# Patient Record
Sex: FEMALE | Race: WHITE | NOT HISPANIC OR LATINO | URBAN - METROPOLITAN AREA
[De-identification: names, ages, dates, MRNs, and addresses within clinical notes are randomized per-mention and may not be internally consistent; named-entity substitution may affect disease eponyms.]

---

## 2017-02-28 ENCOUNTER — OFFICE (OUTPATIENT)
Dept: URBAN - METROPOLITAN AREA OTHER 6 | Facility: OTHER | Age: 57
End: 2017-02-28

## 2017-02-28 VITALS
DIASTOLIC BLOOD PRESSURE: 68 MMHG | HEIGHT: 65 IN | HEART RATE: 88 BPM | SYSTOLIC BLOOD PRESSURE: 124 MMHG | WEIGHT: 163 LBS

## 2017-02-28 DIAGNOSIS — K30 FUNCTIONAL DYSPEPSIA: ICD-10-CM

## 2017-02-28 DIAGNOSIS — Z86.010 PERSONAL HISTORY OF COLONIC POLYPS: ICD-10-CM

## 2017-02-28 PROCEDURE — 99202 OFFICE O/P NEW SF 15 MIN: CPT

## 2017-03-23 ENCOUNTER — ANESTHESIA EVENT (OUTPATIENT)
Dept: PERIOP | Facility: HOSPITAL | Age: 57
End: 2017-03-23

## 2017-03-23 RX ORDER — PANTOPRAZOLE SODIUM 40 MG/1
40 TABLET, DELAYED RELEASE ORAL 2 TIMES DAILY
COMMUNITY
End: 2019-03-07 | Stop reason: SDUPTHER

## 2017-03-23 RX ORDER — SUCRALFATE 1 G/1
1 TABLET ORAL
COMMUNITY
End: 2018-11-05 | Stop reason: SDUPTHER

## 2017-03-23 RX ORDER — LOSARTAN POTASSIUM AND HYDROCHLOROTHIAZIDE 12.5; 5 MG/1; MG/1
0.5 TABLET ORAL DAILY
COMMUNITY

## 2017-03-23 RX ORDER — ONDANSETRON 4 MG/1
4 TABLET, FILM COATED ORAL EVERY 8 HOURS PRN
COMMUNITY
End: 2018-09-13

## 2017-03-24 ENCOUNTER — HOSPITAL ENCOUNTER (OUTPATIENT)
Facility: HOSPITAL | Age: 57
Setting detail: HOSPITAL OUTPATIENT SURGERY
Discharge: HOME | End: 2017-03-24
Attending: INTERNAL MEDICINE | Admitting: INTERNAL MEDICINE

## 2017-03-24 ENCOUNTER — ANESTHESIA (OUTPATIENT)
Dept: PERIOP | Facility: HOSPITAL | Age: 57
End: 2017-03-24

## 2017-03-24 ENCOUNTER — ON CAMPUS - OUTPATIENT (OUTPATIENT)
Dept: URBAN - METROPOLITAN AREA HOSPITAL 28 | Facility: HOSPITAL | Age: 57
End: 2017-03-24
Payer: COMMERCIAL

## 2017-03-24 ENCOUNTER — PREP FOR SURGERY (OUTPATIENT)
Dept: GASTROENTEROLOGY | Facility: HOSPITAL | Age: 57
End: 2017-03-24

## 2017-03-24 VITALS
RESPIRATION RATE: 16 BRPM | DIASTOLIC BLOOD PRESSURE: 72 MMHG | OXYGEN SATURATION: 100 % | SYSTOLIC BLOOD PRESSURE: 110 MMHG | WEIGHT: 157.2 LBS | BODY MASS INDEX: 26.19 KG/M2 | HEART RATE: 89 BPM | HEIGHT: 65 IN | TEMPERATURE: 98.3 F

## 2017-03-24 DIAGNOSIS — K57.30 DIVERTICULOSIS OF LARGE INTESTINE WITHOUT PERFORATION OR ABS: ICD-10-CM

## 2017-03-24 DIAGNOSIS — K62.0 ANAL POLYP: ICD-10-CM

## 2017-03-24 DIAGNOSIS — K62.1 RECTAL POLYP: ICD-10-CM

## 2017-03-24 DIAGNOSIS — K29.60 OTHER GASTRITIS WITHOUT BLEEDING: ICD-10-CM

## 2017-03-24 DIAGNOSIS — Z86.010 HISTORY OF COLON POLYPS: ICD-10-CM

## 2017-03-24 DIAGNOSIS — K63.5 POLYP OF COLON: ICD-10-CM

## 2017-03-24 DIAGNOSIS — R10.13 DYSPEPSIA: ICD-10-CM

## 2017-03-24 DIAGNOSIS — D12.2 BENIGN NEOPLASM OF ASCENDING COLON: ICD-10-CM

## 2017-03-24 DIAGNOSIS — Z86.010 PERSONAL HISTORY OF COLONIC POLYPS: ICD-10-CM

## 2017-03-24 DIAGNOSIS — K21.0 GASTRO-ESOPHAGEAL REFLUX DISEASE WITH ESOPHAGITIS: ICD-10-CM

## 2017-03-24 LAB — POTASSIUM BLD-SCNC: 3.1 MMOL/L (ref 3.5–5.2)

## 2017-03-24 PROCEDURE — 45380 COLONOSCOPY AND BIOPSY: CPT | Mod: 59,33

## 2017-03-24 PROCEDURE — 43239 EGD BIOPSY SINGLE/MULTIPLE: CPT

## 2017-03-24 PROCEDURE — 45380 COLONOSCOPY AND BIOPSY: CPT | Mod: 33,59

## 2017-03-24 PROCEDURE — 45385 COLONOSCOPY W/LESION REMOVAL: CPT | Mod: 33

## 2017-03-24 PROCEDURE — 93010 ELECTROCARDIOGRAM REPORT: CPT | Performed by: INTERNAL MEDICINE

## 2017-03-24 PROCEDURE — 84132 ASSAY OF SERUM POTASSIUM: CPT | Performed by: NURSE ANESTHETIST, CERTIFIED REGISTERED

## 2017-03-24 PROCEDURE — 25010000002 PROPOFOL 10 MG/ML EMULSION: Performed by: NURSE ANESTHETIST, CERTIFIED REGISTERED

## 2017-03-24 PROCEDURE — 93005 ELECTROCARDIOGRAM TRACING: CPT | Performed by: NURSE ANESTHETIST, CERTIFIED REGISTERED

## 2017-03-24 RX ORDER — SODIUM CHLORIDE 0.9 % (FLUSH) 0.9 %
1-10 SYRINGE (ML) INJECTION AS NEEDED
Status: DISCONTINUED | OUTPATIENT
Start: 2017-03-24 | End: 2017-03-24 | Stop reason: HOSPADM

## 2017-03-24 RX ORDER — SODIUM CHLORIDE 0.9 % (FLUSH) 0.9 %
1-10 SYRINGE (ML) INJECTION AS NEEDED
Status: CANCELLED | OUTPATIENT
Start: 2017-03-24

## 2017-03-24 RX ORDER — PROPOFOL 10 MG/ML
VIAL (ML) INTRAVENOUS AS NEEDED
Status: DISCONTINUED | OUTPATIENT
Start: 2017-03-24 | End: 2017-03-24 | Stop reason: SURG

## 2017-03-24 RX ORDER — SODIUM CHLORIDE, SODIUM LACTATE, POTASSIUM CHLORIDE, CALCIUM CHLORIDE 600; 310; 30; 20 MG/100ML; MG/100ML; MG/100ML; MG/100ML
9 INJECTION, SOLUTION INTRAVENOUS CONTINUOUS
Status: DISCONTINUED | OUTPATIENT
Start: 2017-03-24 | End: 2017-03-24 | Stop reason: HOSPADM

## 2017-03-24 RX ORDER — LIDOCAINE HYDROCHLORIDE 20 MG/ML
INJECTION, SOLUTION INFILTRATION; PERINEURAL AS NEEDED
Status: DISCONTINUED | OUTPATIENT
Start: 2017-03-24 | End: 2017-03-24 | Stop reason: SURG

## 2017-03-24 RX ORDER — GLYCOPYRROLATE 0.2 MG/ML
INJECTION INTRAMUSCULAR; INTRAVENOUS AS NEEDED
Status: DISCONTINUED | OUTPATIENT
Start: 2017-03-24 | End: 2017-03-24 | Stop reason: SURG

## 2017-03-24 RX ORDER — LIDOCAINE HYDROCHLORIDE 10 MG/ML
0.3 INJECTION, SOLUTION EPIDURAL; INFILTRATION; INTRACAUDAL; PERINEURAL ONCE
Status: COMPLETED | OUTPATIENT
Start: 2017-03-24 | End: 2017-03-24

## 2017-03-24 RX ADMIN — SODIUM CHLORIDE, POTASSIUM CHLORIDE, SODIUM LACTATE AND CALCIUM CHLORIDE 9 ML/HR: 600; 310; 30; 20 INJECTION, SOLUTION INTRAVENOUS at 13:25

## 2017-03-24 RX ADMIN — PROPOFOL 40 MG: 10 INJECTION, EMULSION INTRAVENOUS at 14:29

## 2017-03-24 RX ADMIN — PROPOFOL 50 MG: 10 INJECTION, EMULSION INTRAVENOUS at 14:20

## 2017-03-24 RX ADMIN — LIDOCAINE HYDROCHLORIDE 0.1 ML: 10 INJECTION, SOLUTION EPIDURAL; INFILTRATION; INTRACAUDAL; PERINEURAL at 13:25

## 2017-03-24 RX ADMIN — PROPOFOL 50 MG: 10 INJECTION, EMULSION INTRAVENOUS at 14:17

## 2017-03-24 RX ADMIN — SODIUM CHLORIDE, POTASSIUM CHLORIDE, SODIUM LACTATE AND CALCIUM CHLORIDE: 600; 310; 30; 20 INJECTION, SOLUTION INTRAVENOUS at 13:59

## 2017-03-24 RX ADMIN — PROPOFOL 50 MG: 10 INJECTION, EMULSION INTRAVENOUS at 14:09

## 2017-03-24 RX ADMIN — PROPOFOL 30 MG: 10 INJECTION, EMULSION INTRAVENOUS at 14:25

## 2017-03-24 RX ADMIN — PROPOFOL 50 MG: 10 INJECTION, EMULSION INTRAVENOUS at 14:14

## 2017-03-24 RX ADMIN — GLYCOPYRROLATE 0.2 MG: 0.2 INJECTION INTRAMUSCULAR; INTRAVENOUS at 14:02

## 2017-03-24 RX ADMIN — LIDOCAINE HYDROCHLORIDE 100 MG: 20 INJECTION, SOLUTION INFILTRATION; PERINEURAL at 14:06

## 2017-03-24 RX ADMIN — PROPOFOL 20 MG: 10 INJECTION, EMULSION INTRAVENOUS at 14:36

## 2017-03-24 RX ADMIN — PROPOFOL 40 MG: 10 INJECTION, EMULSION INTRAVENOUS at 14:34

## 2017-03-24 RX ADMIN — PROPOFOL 100 MG: 10 INJECTION, EMULSION INTRAVENOUS at 14:06

## 2017-03-24 NOTE — ANESTHESIA PREPROCEDURE EVALUATION
Anesthesia Evaluation     Patient summary reviewed and Nursing notes reviewed   no history of anesthetic complications:  NPO Status: > 6 hours   Airway   Mallampati: III  TM distance: >3 FB  Neck ROM: full  possible difficult intubation  Dental - normal exam     Pulmonary - normal exam   Cardiovascular - normal exam  Exercise tolerance: excellent (>7 METS)    Rhythm: regular  Rate: normal    (+) hypertension well controlled,       Neuro/Psych- negative ROS  GI/Hepatic/Renal/Endo    (+)  GERD poorly controlled,     Musculoskeletal (-) negative ROS    Abdominal  - normal exam   Substance History - negative use     OB/GYN          Other - negative ROS                                   Anesthesia Plan    ASA 2     MAC     Anesthetic plan and risks discussed with patient.  Use of blood products discussed with patient .
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
## 2017-03-24 NOTE — ANESTHESIA POSTPROCEDURE EVALUATION
Patient: Ganesh Caban    Procedure Summary     Date Anesthesia Start Anesthesia Stop Room / Location    03/24/17 0809 [id number] [facility] / [facility]       Procedure Diagnosis Surgeon Provider    ESOPHAGOGASTRODUODENOSCOPY WITH BIOPSIES (N/A Esophagus); COLONOSCOPY; POLYPECTOMY (N/A ) Gastritis; Reflux esophagitis; Diverticula of colon; Polyp of colon  (Z86.010, K30) MD Te Swenson CRNA          Anesthesia Type: MAC  Last vitals  BP      Temp      Pulse     Resp      SpO2        Post Anesthesia Care and Evaluation    Patient location during evaluation: PHASE II  Patient participation: complete - patient participated  Level of consciousness: awake and alert  Pain score: 0  Pain management: adequate  Airway patency: patent  Anesthetic complications: No anesthetic complications  PONV Status: none  Cardiovascular status: acceptable  Respiratory status: acceptable  Hydration status: acceptable

## 2017-03-24 NOTE — PLAN OF CARE
Problem: GI Endoscopy (Adult)  Goal: Signs and Symptoms of Listed Potential Problems Will be Absent or Manageable (GI Endoscopy)  Outcome: Ongoing (interventions implemented as appropriate)    03/24/17 1403   GI Endoscopy   Problems Assessed (GI Endoscopy) all   Problems Present (GI Endoscopy) none

## 2017-03-24 NOTE — OP NOTE
DATE OF PROCEDURE:  03/24/2017    PROCEDURES PERFORMED:   Esophagogastroduodenoscopy with biopsy.     SURGEON:  Myke Robbins MD     INDICATIONS:    1.  Refractory dyspepsia, despite her PPI.  2.  Mild dysphagia.     MEDICATIONS:  Monitored anesthesia care; please see Anesthesia record.    DESCRIPTION OF PROCEDURE:  Risks and benefits of the procedure were explained to the patient. Informed consent was signed. She was placed in left lateral decubitus position and given her IV sedation. Bite block was placed between her incisors. Scope was introduced in into oropharynx, advanced under direct visualization into the esophagus, through to the distal esophagus. Z-line was mildly irregular. Scope was advanced through this easily. There was no evidence of stricture, and retroflexed, revealing normal cardia and fundus. There was evident as well as healing ulcers versus Chhaya-Eller tears, along the lesser curve; there were 2 of these. Scope was deretroflexed and advanced in the antrum. There was diffuse erythematous pattern to the distal body and streaky erythema in the antrum. The scope was advanced through the pylorus through the duodenal bulb which was examined and normal, and around the angle to 2nd and 3rd portions of the duodenum were normal as well. Scope was withdrawn back in the antrum. Biopsies were taken of the antrum, body, and lesser curve, and labeled random gastric biopsies. Scope was withdrawn in the distal esophagus and biopsies were taken to rule out short segment Doshi's esophagus; this was not suspected visually. The scope was withdrawn. The remainder of  esophagus was normal-appearing. Scope was taken from the patient. She tolerated the procedure very well.     IMPRESSION:   1.  Normal duodenum.   2.  Moderate gastritis, including possible Chhaya-Eller tears; status post biopsies.   3.  Reflux esophagitis, status post biopsies.     RECOMMENDATIONS:  At this point, the patient is  subjectively improved on her combination of Protonix and Carafate. We will continue these medicines; get her the biopsy results as soon as they are available, along with the appropriate recommendations. Have her follow up in the office in 6 weeks.        Myke Robbins M.D.  SO/ms  D:  03/24/2017 14:45:18   T:  03/24/2017 16:37:02   Job ID:  [id number]   Document ID:  [id number]  cc:  Amparo Ackerman M.D.*

## 2017-03-24 NOTE — OP NOTE
DATE OF PROCEDURE:  03/24/2017    PROCEDURES PERFORMED:  Colonoscopy with polypectomy.     SURGEON:  Myke Robbins MD     INDICATION: Screening for colorectal cancer, in patient with previous colon polyps.     MEDICATIONS:  Monitored anesthesia care; please see Anesthesia record.     DESCRIPTION OF PROCEDURE:  Risks and benefits of the procedure were explained to the patient. Informed consent was signed. She was placed in left lateral decubitus position and given her IV sedation. A rectal exam revealed no external lesions, normal anal tone; no rectal mass. Scope was introduced in rectum, advanced under direct visualization with ease, through a well-prepped colon. There were 2 large polyps noted within the sigmoid colon. Scope was advanced around the splenic flexure, reduced; advanced through transverse colon. There were scattered diverticula noted on the left as well. Scope was advanced around the hepatic flexure, reduced in the ascending colon, then advanced in the cecum. Cecum was well-prepped, and identified by the appendiceal orifice and the ileocecal valve. Ileocecal valve was intubated. Terminal ileum was normal in appearance. Scope was withdrawn back in the ascending colon. On the first fold of the ascending colon was a sessile, 6 x 12 mm polyp that was snared and retrieved with excellent hemostasis; sent separately as ascending colon polyp. Scope was withdrawn through the remainder of the ascending colon; no further mucosal lesions were noted there. Around the hepatic flexure in the transverse colon, there was a sessile, hyperplastic-appearing polyp that was 4 x 6 mm noted in the transverse colon. This was removed by cold biopsy forceps and sent separately. Scope was then withdrawn around the splenic flexure, through the descending colon, to approximately 40 cm, where there was a sessile, broad-based polyp noted, 1.4 x 1 cm. It was snared in piecemeal fashion and felt to be completely removed; sent  as polyp at 40 cm. The scope was then withdrawn to 30 cm, where a second large polyp was noted. It was broad-based and 1 x 1 cm in size. It was removed with hot cautery and sent separately as polyp at 30 cm. Scope was then withdrawn through the remainder of the sigmoid colon, where the diverticula were limited to the rectum; where there was a diminutive, 3 x 4 mm polyp that was biopsied; felt to be completely removed and sent separately as rectal polyp. Scope was retroflexed, revealing intact dentate line and no further mucosal lesions. Scope was deretroflexed, withdrawn. Patient tolerated procedure very well.     IMPRESSION:    1.  Colonoscopy to terminal ileum with good prep.   2.  Multiple polyps, numbering 5; removed 1 each from the ascending colon, transverse colon, sigmoid at 40, sigmoid at 30, rectum.   3.  Sigmoid diverticulosis.    RECOMMENDATIONS:  Have the patient avoid aspirin and nonsteroidal anti-inflammatory drugs for 2 weeks to decrease her risk of postpolypectomy bleeding. We will contact her with the results of polyps as soon as they are available. However, most likely recommend a repeat exam in 3 years.        Myke Robbins M.D.  SO/ms  D:  03/24/2017 14:48:56   T:  03/24/2017 16:47:13   Job ID:  [id number]   Document ID:  [id number]  cc:  Amparo Ackerman M.D.*

## 2017-03-24 NOTE — PLAN OF CARE
Problem: Patient Care Overview (Adult)  Goal: Plan of Care Review  Outcome: Ongoing (interventions implemented as appropriate)    03/24/17 1333   Coping/Psychosocial Response Interventions   Plan Of Care Reviewed With patient   Patient Care Overview   Progress no change   Outcome Evaluation   Outcome Summary/Follow up Plan VSS, NO CO, READY FOR PROCEDURE       Goal: Adult Individualization and Mutuality  Outcome: Ongoing (interventions implemented as appropriate)    Problem: GI Endoscopy (Adult)  Goal: Signs and Symptoms of Listed Potential Problems Will be Absent or Manageable (GI Endoscopy)  Outcome: Ongoing (interventions implemented as appropriate)

## 2017-03-24 NOTE — PLAN OF CARE
Problem: Patient Care Overview (Adult)  Goal: Plan of Care Review  Outcome: Ongoing (interventions implemented as appropriate)    03/24/17 1446   Coping/Psychosocial Response Interventions   Plan Of Care Reviewed With patient   Patient Care Overview   Progress improving   Outcome Evaluation   Outcome Summary/Follow up Plan vss, ready for dc       Goal: Adult Individualization and Mutuality  Outcome: Ongoing (interventions implemented as appropriate)    Problem: GI Endoscopy (Adult)  Goal: Signs and Symptoms of Listed Potential Problems Will be Absent or Manageable (GI Endoscopy)  Outcome: Ongoing (interventions implemented as appropriate)

## 2017-03-24 NOTE — BRIEF OP NOTE
ESOPHAGOGASTRODUODENOSCOPY, COLONOSCOPY  Procedure Note    Ganesh Caban  3/24/2017    Pre-op Diagnosis:   Z86.010, K30    Post-op Diagnosis:     Post-Op Diagnosis Codes:     * Gastritis [K29.70]     * Reflux esophagitis [K21.0]     * Diverticula of colon [K57.30]     * Polyp of colon [K63.5]    Procedure/CPT® Codes:      Procedure(s):  ESOPHAGOGASTRODUODENOSCOPY WITH BIOPSIES  COLONOSCOPY; POLYPECTOMY    Surgeon(s):  Myke Robbins MD    Anesthesia: Monitor Anesthesia Care    Staff:   Circulator: Mirta Del Rio RN  Scrub Person: Cherise Rooney    Estimated Blood Loss: * No values recorded between 3/24/2017  1:58 PM and 3/24/2017  2:38 PM *  Urine Voided: * No values recorded between 3/24/2017  1:58 PM and 3/24/2017  2:38 PM *    Specimens:                  ID Type Source Tests Collected by Time Destination   A :  Tissue Stomach TISSUE EXAM Myke Robbins MD 3/24/2017 1412    B :  Tissue Esophagus, Distal TISSUE EXAM Myke Robbins MD 3/24/2017 1414    C :  Polyp Large Intestine, Right / Ascending Colon TISSUE EXAM Myke Robbins MD 3/24/2017 1422    D :  Polyp Large Intestine, Transverse Colon TISSUE EXAM Myke Robbins MD 3/24/2017 1426    E : POLYP @ 40 CM Polyp Large Intestine TISSUE EXAM Myke Robbins MD 3/24/2017 1429    F : POLYP @ 30 CM Polyp Large Intestine TISSUE EXAM Myke Robbisn MD 3/24/2017 1434    G :  Polyp Large Intestine, Rectum TISSUE EXAM Myke Robbins MD 3/24/2017 1437          Drains:           Findings: Normal Duodenum  Moderate Gastritis with ?able MWT-Biopsy  Reflux esophagitis - BIopsy  Colon to TI good prep  Polyps (5)Snared    Complications: none   [id number]      Myke Robbins MD     Date: 3/24/2017  Time: 2:41 PM

## 2017-03-30 LAB
LAB AP CASE REPORT: NORMAL
Lab: NORMAL
PATH REPORT.FINAL DX SPEC: NORMAL

## 2017-09-11 ENCOUNTER — OFFICE (OUTPATIENT)
Dept: URBAN - METROPOLITAN AREA CLINIC 71 | Facility: CLINIC | Age: 57
End: 2017-09-11

## 2017-09-11 VITALS
DIASTOLIC BLOOD PRESSURE: 86 MMHG | HEIGHT: 65 IN | HEART RATE: 72 BPM | SYSTOLIC BLOOD PRESSURE: 122 MMHG | WEIGHT: 161 LBS

## 2017-09-11 DIAGNOSIS — K21.0 GASTRO-ESOPHAGEAL REFLUX DISEASE WITH ESOPHAGITIS: ICD-10-CM

## 2017-09-11 PROCEDURE — 99212 OFFICE O/P EST SF 10 MIN: CPT

## 2018-01-08 ENCOUNTER — LAB (OUTPATIENT)
Dept: LAB | Facility: HOSPITAL | Age: 58
End: 2018-01-08
Attending: INTERNAL MEDICINE

## 2018-01-08 ENCOUNTER — OFFICE (OUTPATIENT)
Dept: URBAN - METROPOLITAN AREA CLINIC 71 | Facility: CLINIC | Age: 58
End: 2018-01-08

## 2018-01-08 ENCOUNTER — TRANSCRIBE ORDERS (OUTPATIENT)
Dept: ADMINISTRATIVE | Facility: HOSPITAL | Age: 58
End: 2018-01-08

## 2018-01-08 VITALS
SYSTOLIC BLOOD PRESSURE: 110 MMHG | WEIGHT: 164 LBS | HEIGHT: 65 IN | HEART RATE: 64 BPM | DIASTOLIC BLOOD PRESSURE: 78 MMHG

## 2018-01-08 DIAGNOSIS — K21.00 REFLUX ESOPHAGITIS: ICD-10-CM

## 2018-01-08 DIAGNOSIS — K59.00 CONSTIPATION, UNSPECIFIED: ICD-10-CM

## 2018-01-08 DIAGNOSIS — K59.00 CONSTIPATION, UNSPECIFIED CONSTIPATION TYPE: ICD-10-CM

## 2018-01-08 DIAGNOSIS — K21.0 GASTRO-ESOPHAGEAL REFLUX DISEASE WITH ESOPHAGITIS: ICD-10-CM

## 2018-01-08 DIAGNOSIS — K21.00 REFLUX ESOPHAGITIS: Primary | ICD-10-CM

## 2018-01-08 PROCEDURE — 36415 COLL VENOUS BLD VENIPUNCTURE: CPT

## 2018-01-08 PROCEDURE — 99214 OFFICE O/P EST MOD 30 MIN: CPT

## 2018-01-08 PROCEDURE — 83516 IMMUNOASSAY NONANTIBODY: CPT

## 2018-01-09 LAB
ENDOMYSIUM IGA SER QL: NEGATIVE
GLIADIN PEPTIDE IGA SER-ACNC: 5 UNITS (ref 0–19)
GLIADIN PEPTIDE IGG SER-ACNC: 3 UNITS (ref 0–19)
IGA SERPL-MCNC: 316 MG/DL (ref 87–352)
TTG IGA SER-ACNC: <2 U/ML (ref 0–3)
TTG IGG SER-ACNC: <2 U/ML (ref 0–5)

## 2018-01-25 ENCOUNTER — HOSPITAL ENCOUNTER (OUTPATIENT)
Dept: NUCLEAR MEDICINE | Facility: HOSPITAL | Age: 58
Discharge: HOME | End: 2018-01-25
Attending: INTERNAL MEDICINE

## 2018-01-25 DIAGNOSIS — K21.00 REFLUX ESOPHAGITIS: ICD-10-CM

## 2018-01-25 DIAGNOSIS — K59.00 CONSTIPATION, UNSPECIFIED CONSTIPATION TYPE: ICD-10-CM

## 2018-01-25 PROCEDURE — A9541 TC99M SULFUR COLLOID: HCPCS | Performed by: INTERNAL MEDICINE

## 2018-01-25 PROCEDURE — 0 TECHNETIUM SULFUR COLLOID: Performed by: INTERNAL MEDICINE

## 2018-01-25 PROCEDURE — 78264 GASTRIC EMPTYING IMG STUDY: CPT

## 2018-01-25 RX ADMIN — TECHNETIUM TC 99M SULFUR COLLOID 1 DOSE: KIT at 08:30

## 2018-03-19 ENCOUNTER — OFFICE (OUTPATIENT)
Dept: URBAN - METROPOLITAN AREA CLINIC 71 | Facility: CLINIC | Age: 58
End: 2018-03-19

## 2018-03-19 VITALS
DIASTOLIC BLOOD PRESSURE: 80 MMHG | SYSTOLIC BLOOD PRESSURE: 120 MMHG | HEART RATE: 64 BPM | HEIGHT: 65 IN | WEIGHT: 166 LBS

## 2018-03-19 DIAGNOSIS — Z86.010 PERSONAL HISTORY OF COLONIC POLYPS: ICD-10-CM

## 2018-03-19 DIAGNOSIS — K59.00 CONSTIPATION, UNSPECIFIED: ICD-10-CM

## 2018-03-19 DIAGNOSIS — K21.0 GASTRO-ESOPHAGEAL REFLUX DISEASE WITH ESOPHAGITIS: ICD-10-CM

## 2018-03-19 PROCEDURE — 99213 OFFICE O/P EST LOW 20 MIN: CPT

## 2018-07-23 ENCOUNTER — OFFICE VISIT (OUTPATIENT)
Dept: GASTROENTEROLOGY | Facility: CLINIC | Age: 58
End: 2018-07-23

## 2018-07-23 VITALS — BODY MASS INDEX: 27.59 KG/M2 | WEIGHT: 165.6 LBS | HEIGHT: 65 IN

## 2018-07-23 DIAGNOSIS — K21.00 GASTROESOPHAGEAL REFLUX DISEASE WITH ESOPHAGITIS: ICD-10-CM

## 2018-07-23 DIAGNOSIS — R10.11 RUQ ABDOMINAL PAIN: Primary | ICD-10-CM

## 2018-07-23 PROCEDURE — 99214 OFFICE O/P EST MOD 30 MIN: CPT | Performed by: INTERNAL MEDICINE

## 2018-07-23 RX ORDER — RANITIDINE 300 MG/1
300 TABLET ORAL NIGHTLY
Qty: 90 TABLET | Refills: 3 | Status: SHIPPED | OUTPATIENT
Start: 2018-07-23 | End: 2018-10-30
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
## 2018-08-23 ENCOUNTER — OFFICE VISIT (OUTPATIENT)
Dept: OBSTETRICS AND GYNECOLOGY | Age: 58
End: 2018-08-23

## 2018-08-23 VITALS
HEIGHT: 65 IN | SYSTOLIC BLOOD PRESSURE: 112 MMHG | BODY MASS INDEX: 28.16 KG/M2 | WEIGHT: 169 LBS | DIASTOLIC BLOOD PRESSURE: 74 MMHG

## 2018-08-23 DIAGNOSIS — Z11.51 ENCOUNTER FOR SCREENING FOR HUMAN PAPILLOMAVIRUS (HPV): ICD-10-CM

## 2018-08-23 DIAGNOSIS — N81.11 MIDLINE CYSTOCELE: ICD-10-CM

## 2018-08-23 DIAGNOSIS — Z01.419 WELL WOMAN EXAM WITH ROUTINE GYNECOLOGICAL EXAM: Primary | ICD-10-CM

## 2018-08-23 DIAGNOSIS — K40.90 UNILATERAL INGUINAL HERNIA WITHOUT OBSTRUCTION OR GANGRENE, RECURRENCE NOT SPECIFIED: ICD-10-CM

## 2018-08-23 DIAGNOSIS — Z12.39 BREAST CANCER SCREENING: ICD-10-CM

## 2018-08-23 PROCEDURE — 99386 PREV VISIT NEW AGE 40-64: CPT | Performed by: OBSTETRICS & GYNECOLOGY

## 2018-08-23 NOTE — PROGRESS NOTES
"Chief complaint: New Gyn/annual    Subjective   History of Present Illness    Ganesh Caban is a 57 y.o.  who presents for annual exam. Has been seen by this office in past, over 3 yrs ago. Last seen by Dr Henson.  H/o menopause . No postmenopausal bleeding. Does has some discomfort w/ intercourse with \"bulge\" in vagina. Was told she had pelvic prolapse in past. Did go to pelvic PT but didn't get better. Desires evaluation and tx for pelvic prolapse.    Soc Hx- ,     Obstetric History:  OB History      Para Term  AB Living    1       1      SAB TAB Ectopic Molar Multiple Live Births    1                  Obstetric Comments    H/o infertility         Menstrual History:     No LMP recorded (lmp unknown). Patient is postmenopausal.         Current contraception: post menopausal status  History of abnormal Pap smear: no  Received Gardasil immunization: no  Perform regular self breast exam: yes -    Family history of uterine or ovarian cancer: no  Family History of colon cancer: no  Family history of breast cancer: no    Mammogram: ordered.  Colonoscopy: up to date.  DEXA: not indicated.    Exercise: moderately active  Calcium/Vitamin D: adequate intake    The following portions of the patient's history were reviewed and updated as appropriate: allergies, current medications, past family history, past medical history, past social history, past surgical history and problem list.    Review of Systems   Constitutional: Negative for activity change, fatigue, fever and unexpected weight change.   Respiratory: Negative for chest tightness and shortness of breath.    Cardiovascular: Negative for chest pain, palpitations and leg swelling.   Gastrointestinal: Negative for abdominal distention, abdominal pain, blood in stool, constipation, diarrhea, nausea and vomiting.   Endocrine: Negative for cold intolerance, heat intolerance, polydipsia, polyphagia and polyuria.   Genitourinary: " "Positive for dyspareunia.   Musculoskeletal: Negative for arthralgias and back pain.   Skin: Negative for color change.   Neurological: Negative for weakness and headaches.   Hematological: Does not bruise/bleed easily.   Psychiatric/Behavioral: Negative for confusion.       Pertinent items are noted in HPI.     Objective   Physical Exam    /74   Ht 165.1 cm (65\")   Wt 76.7 kg (169 lb)   LMP  (LMP Unknown)   BMI 28.12 kg/m²     General:   alert, appears stated age and cooperative   Neck: no asymmetry, masses, or scars   Heart: regular rate and rhythm, S1, S2 normal, no murmur, click, rub or gallop   Lungs: clear to auscultation bilaterally   Abdomen: Soft obese, NT, large hernia in LLQ   Breast: inspection negative, no nipple discharge or bleeding, no masses or nodularity palpable and , bilateral implants noted   Vulva: normal, Bartholin's, Urethra, Spur's normal   Vagina: atrophic, cytocele prolapses to level of introitus   Cervix: no bleeding following Pap, no cervical motion tenderness, no lesions and nulliparous appearance   Uterus: normal size, non-tender, normal shape and consistency, uterine prolapse with valsalva   Adnexa: normal adnexa and no mass, fullness, tenderness   Rectal: not indicated     Assessment/Plan   Ganesh was seen today for gynecologic exam.    Diagnoses and all orders for this visit:    Well woman exam with routine gynecological exam    Encounter for screening for human papillomavirus (HPV)    Breast cancer screening  -     Mammo screening bilateral w CAD; Future    tBreast self exam technique reviewed and patient encouraged to perform self-exam monthly.  Discussed healthy lifestyle modifications.  Pap smear sent    Left inguinal hernia- refer to general surgery for eval and treatment    Pelvic floor prolapse- referred to Urogyn for evaluation for possible surgical tx    Pap w/ HPV sent             "

## 2018-08-29 LAB
CYTOLOGIST CVX/VAG CYTO: NORMAL
CYTOLOGY CVX/VAG DOC THIN PREP: NORMAL
DX ICD CODE: NORMAL
HIV 1 & 2 AB SER-IMP: NORMAL
HPV I/H RISK 1 DNA CVX QL PROBE+SIG AMP: NEGATIVE
Lab: NORMAL
OTHER STN SPEC: NORMAL
PATH REPORT.FINAL DX SPEC: NORMAL
STAT OF ADQ CVX/VAG CYTO-IMP: NORMAL

## 2018-08-30 ENCOUNTER — TELEPHONE (OUTPATIENT)
Dept: OBSTETRICS AND GYNECOLOGY | Age: 58
End: 2018-08-30

## 2018-09-13 ENCOUNTER — OFFICE VISIT (OUTPATIENT)
Dept: SURGERY | Facility: CLINIC | Age: 58
End: 2018-09-13

## 2018-09-13 VITALS
SYSTOLIC BLOOD PRESSURE: 104 MMHG | OXYGEN SATURATION: 98 % | DIASTOLIC BLOOD PRESSURE: 70 MMHG | HEIGHT: 65 IN | BODY MASS INDEX: 28.02 KG/M2 | WEIGHT: 168.2 LBS | HEART RATE: 92 BPM

## 2018-09-13 DIAGNOSIS — K40.90 LEFT INGUINAL HERNIA: Primary | ICD-10-CM

## 2018-09-13 DIAGNOSIS — Z23 NEED FOR IMMUNIZATION AGAINST INFLUENZA: ICD-10-CM

## 2018-09-13 PROCEDURE — 99203 OFFICE O/P NEW LOW 30 MIN: CPT | Performed by: SURGERY
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
## 2018-09-18 ENCOUNTER — HOSPITAL ENCOUNTER (OUTPATIENT)
Dept: ULTRASOUND IMAGING | Facility: HOSPITAL | Age: 58
Discharge: HOME | End: 2018-09-18
Attending: INTERNAL MEDICINE | Admitting: INTERNAL MEDICINE

## 2018-09-18 DIAGNOSIS — R10.11 RUQ ABDOMINAL PAIN: ICD-10-CM

## 2018-09-18 PROCEDURE — 76705 ECHO EXAM OF ABDOMEN: CPT

## 2018-09-27 ENCOUNTER — OFFICE VISIT (OUTPATIENT)
Dept: GASTROENTEROLOGY | Facility: CLINIC | Age: 58
End: 2018-09-27

## 2018-09-27 VITALS — WEIGHT: 173 LBS | BODY MASS INDEX: 28.82 KG/M2 | HEIGHT: 65 IN

## 2018-09-27 DIAGNOSIS — R10.13 DYSPEPSIA: ICD-10-CM

## 2018-09-27 DIAGNOSIS — K21.00 GASTROESOPHAGEAL REFLUX DISEASE WITH ESOPHAGITIS: Primary | ICD-10-CM

## 2018-09-27 PROCEDURE — 99213 OFFICE O/P EST LOW 20 MIN: CPT | Performed by: INTERNAL MEDICINE
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
## 2018-10-09 ENCOUNTER — APPOINTMENT (OUTPATIENT)
Dept: PREADMISSION TESTING | Facility: HOSPITAL | Age: 58
End: 2018-10-09

## 2018-10-09 VITALS
SYSTOLIC BLOOD PRESSURE: 118 MMHG | BODY MASS INDEX: 28.62 KG/M2 | HEIGHT: 65 IN | HEART RATE: 88 BPM | WEIGHT: 171.8 LBS | OXYGEN SATURATION: 98 % | RESPIRATION RATE: 16 BRPM | DIASTOLIC BLOOD PRESSURE: 84 MMHG

## 2018-10-09 LAB
ANION GAP SERPL CALCULATED.3IONS-SCNC: 9.4 MMOL/L
BUN BLD-MCNC: 11 MG/DL (ref 6–20)
BUN/CREAT SERPL: 16.9 (ref 7–25)
CALCIUM SPEC-SCNC: 9.1 MG/DL (ref 8.6–10.5)
CHLORIDE SERPL-SCNC: 102 MMOL/L (ref 98–107)
CO2 SERPL-SCNC: 27.6 MMOL/L (ref 22–29)
CREAT BLD-MCNC: 0.65 MG/DL (ref 0.57–1)
DEPRECATED RDW RBC AUTO: 42.1 FL (ref 37–54)
ERYTHROCYTE [DISTWIDTH] IN BLOOD BY AUTOMATED COUNT: 12.2 % (ref 11.5–14.5)
GFR SERPL CREATININE-BSD FRML MDRD: 95 ML/MIN/1.73
GLUCOSE BLD-MCNC: 82 MG/DL (ref 65–99)
HCT VFR BLD AUTO: 40.6 % (ref 37–47)
HGB BLD-MCNC: 13.4 G/DL (ref 12–16)
MCH RBC QN AUTO: 30.8 PG (ref 27–31)
MCHC RBC AUTO-ENTMCNC: 33 G/DL (ref 31–37)
MCV RBC AUTO: 93.3 FL (ref 81–99)
PLATELET # BLD AUTO: 218 10*3/MM3 (ref 140–500)
PMV BLD AUTO: 10 FL (ref 7.4–10.4)
POTASSIUM BLD-SCNC: 3.6 MMOL/L (ref 3.5–5.2)
RBC # BLD AUTO: 4.35 10*6/MM3 (ref 4.2–5.4)
SODIUM BLD-SCNC: 139 MMOL/L (ref 136–145)
WBC NRBC COR # BLD: 5.14 10*3/MM3 (ref 4.8–10.8)

## 2018-10-09 PROCEDURE — 85027 COMPLETE CBC AUTOMATED: CPT | Performed by: SURGERY

## 2018-10-09 PROCEDURE — 36415 COLL VENOUS BLD VENIPUNCTURE: CPT

## 2018-10-09 PROCEDURE — 80048 BASIC METABOLIC PNL TOTAL CA: CPT | Performed by: SURGERY

## 2018-10-09 RX ORDER — LORATADINE 10 MG/1
10 TABLET ORAL DAILY
COMMUNITY

## 2018-10-09 RX ORDER — CHLORAL HYDRATE 500 MG
1000 CAPSULE ORAL
COMMUNITY

## 2018-10-09 NOTE — PAT
Pt here for PAT visit.  Pre-op tests completed, chg soap given, and instructions reviewed.  Instructed clears until 11:00 am dos, voiced understanding.  EKG obtained from PCP office and sent to media tab.

## 2018-10-09 NOTE — DISCHARGE INSTRUCTIONS
PRE-ADMISSION TESTING INSTRUCTIONS FOR ADULTS    Take these medications the morning of surgery with a small sip of water: protonix      No aspirin, advil, aleve, ibuprofen, naproxen, diet pills, decongestants, or herbal/vitamins for a week prior to surgery.  Stop vitamins, fish oil and garlic a week prior to surgery.    General Instructions:    • Do not eat solid food after midnight the night before surgery.  No gum, mints, or hard candy after midnight the night before surgery.  • You may drink clear liquids the day of surgery up until 2 hours before your arrival time.  (Until 11:00 am)  • Clear liquids are liquids you can see through. Nothing RED in color.    Plain water    Sports drinks  Sodas     Gelatin (Jell-O)  Fruit juices without pulp such as white grape juice and apple juice  Popsicles that contain no fruit or yogurt  Tea or coffee (no cream or milk added)    • It is beneficial for you to have a clear drink that contains carbohydrates just before you leave your house and before your fasting time begins.  We suggest a 20 ounce bottle of Gatorade or Powerade for non-diabetic patients or a 20 ounce bottle of G2 or Powerade Zero for diabetic patients.     • Patients who avoid smoking, chewing tobacco and alcohol for 4 weeks prior to surgery have a reduced risk of post-operative complications.  If at all possible, quit smoking as many days before surgery as you can.    • Do not smoke, use chewing tobacco or drink alcohol the day of surgery    • Bring your C-PAP/ BI-PAP machine if you use one.  • Wear clean comfortable clothes and socks.  • Do not wear contact lenses, lotion, deodorant, or make-up.  Bring a case for your glasses if applicable. You may brush your teeth the morning of surgery.  • You may wear dentures/partials, do not put adhesive/glue on them.  • Bring crutches or walker if applicable.  • Leave all other jewelry and valuables at home.      Preventing a Surgical Site Infection:    • Shower the  night before and on the morning of surgery using the chlorhexidine soap you were given.  Use a clean washcloth with the soap.  Place clean sheets on your bed after showering the night before surgery. Do not use the CHG soap on your hair, face, or private areas. Wash your body gently for five (5) minutes. Do not scrub your skin.  Dry with a clean towel and dress in clean clothing.    • Do not shave the surgical area for 10 days-2 weeks prior to surgery  because the razor can irritate skin and make it easier to develop an infection.  • Make sure you, your family, and all healthcare providers clean their hands with soap and water or an alcohol based hand  before caring for you or your wound.      Day of surgery:    Your surgeon’s office will advise you of your arrival time for the day of surgery.    Upon arrival, a Pre-op nurse and Anesthesia provider will review your health history, obtain vital signs, and answer questions you may have.  The only belongings needed at this time will be your home medications and if applicable your C-PAP/BI-PAP machine.  If you are staying overnight your family can leave the rest of your belongings in the car and bring them to your room later.  A Pre-op nurse will start an IV and you may receive medication in preparation for surgery, including something to help you relax.  Your family will be able to see you in the Pre-op area.  While you are in surgery your family should notify the waiting room  if they leave the waiting room area and provide a contact phone number.    IF you have any questions, you can call the Pre-Admission Department at [phone] or your surgeon's office.  Notify your surgeon if  you become sick, have a fever, productive cough, or cannot be here the day of surgery    Please be aware that surgery does come with discomfort.  We want to make every effort to control your discomfort so please discuss any uncontrolled symptoms with your nurse.    Your doctor will most likely have prescribed pain medications.      If you are going home after surgery, you will receive individualized written care instructions before being discharged.  A responsible adult (over the age of 18) must drive you to and from the hospital on the day of your surgery and stay with you for 24 hours after anesthesia.    If you are staying overnight following surgery, you will be transported to your hospital room following the recovery period.  [facility] has all private rooms.    Deductibles and co-payments are collected on the day of service. Please be prepared to pay the required co-pay, deductible or deposit on the day of service as defined by your plan.

## 2018-10-16 ENCOUNTER — ANESTHESIA EVENT (OUTPATIENT)
Dept: PERIOP | Facility: HOSPITAL | Age: 58
End: 2018-10-16

## 2018-10-17 ENCOUNTER — ANESTHESIA (OUTPATIENT)
Dept: PERIOP | Facility: HOSPITAL | Age: 58
End: 2018-10-17

## 2018-10-17 ENCOUNTER — HOSPITAL ENCOUNTER (OUTPATIENT)
Facility: HOSPITAL | Age: 58
Setting detail: HOSPITAL OUTPATIENT SURGERY
Discharge: HOME | End: 2018-10-17
Attending: SURGERY | Admitting: NURSE ANESTHETIST, CERTIFIED REGISTERED

## 2018-10-17 VITALS
TEMPERATURE: 96.4 F | HEART RATE: 84 BPM | DIASTOLIC BLOOD PRESSURE: 79 MMHG | WEIGHT: 171.2 LBS | SYSTOLIC BLOOD PRESSURE: 118 MMHG | RESPIRATION RATE: 16 BRPM | BODY MASS INDEX: 28.52 KG/M2 | HEIGHT: 65 IN | OXYGEN SATURATION: 92 %

## 2018-10-17 DIAGNOSIS — K40.90 LEFT INGUINAL HERNIA: ICD-10-CM

## 2018-10-17 PROCEDURE — 25010000003 CEFAZOLIN SODIUM-DEXTROSE 2-3 GM-%(50ML) RECONSTITUTED SOLUTION: Performed by: SURGERY

## 2018-10-17 PROCEDURE — S0260 H&P FOR SURGERY: HCPCS | Performed by: SURGERY

## 2018-10-17 PROCEDURE — 25010000002 MIDAZOLAM PER 1 MG: Performed by: NURSE ANESTHETIST, CERTIFIED REGISTERED

## 2018-10-17 PROCEDURE — 25010000002 KETOROLAC TROMETHAMINE PER 15 MG: Performed by: NURSE ANESTHETIST, CERTIFIED REGISTERED

## 2018-10-17 PROCEDURE — 49650 LAP ING HERNIA REPAIR INIT: CPT | Performed by: SURGERY

## 2018-10-17 PROCEDURE — 25010000002 FENTANYL CITRATE (PF) 100 MCG/2ML SOLUTION: Performed by: ANESTHESIOLOGY

## 2018-10-17 PROCEDURE — 25010000002 NEOSTIGMINE PER 0.5 MG: Performed by: NURSE ANESTHETIST, CERTIFIED REGISTERED

## 2018-10-17 PROCEDURE — 25010000002 ONDANSETRON PER 1 MG: Performed by: NURSE ANESTHETIST, CERTIFIED REGISTERED

## 2018-10-17 PROCEDURE — 25010000002 DEXAMETHASONE PER 1 MG: Performed by: NURSE ANESTHETIST, CERTIFIED REGISTERED

## 2018-10-17 PROCEDURE — 49650 LAP ING HERNIA REPAIR INIT: CPT | Performed by: SPECIALIST/TECHNOLOGIST, OTHER

## 2018-10-17 PROCEDURE — 25010000002 PROPOFOL 10 MG/ML EMULSION: Performed by: ANESTHESIOLOGY

## 2018-10-17 PROCEDURE — C1781 MESH (IMPLANTABLE): HCPCS | Performed by: SURGERY

## 2018-10-17 DEVICE — PROLITE MESH, 3IN. X 6IN. (7.5CM X 15CM)
Type: IMPLANTABLE DEVICE | Site: ABDOMEN | Status: FUNCTIONAL
Brand: PROLITE

## 2018-10-17 RX ORDER — BUPIVACAINE HYDROCHLORIDE AND EPINEPHRINE 2.5; 5 MG/ML; UG/ML
INJECTION, SOLUTION EPIDURAL; INFILTRATION; INTRACAUDAL; PERINEURAL AS NEEDED
Status: DISCONTINUED | OUTPATIENT
Start: 2018-10-17 | End: 2018-10-17 | Stop reason: HOSPADM

## 2018-10-17 RX ORDER — SODIUM CHLORIDE 0.9 % (FLUSH) 0.9 %
1-10 SYRINGE (ML) INJECTION AS NEEDED
Status: DISCONTINUED | OUTPATIENT
Start: 2018-10-17 | End: 2018-10-17 | Stop reason: HOSPADM

## 2018-10-17 RX ORDER — SODIUM CHLORIDE 0.9 % (FLUSH) 0.9 %
3 SYRINGE (ML) INJECTION EVERY 12 HOURS SCHEDULED
Status: DISCONTINUED | OUTPATIENT
Start: 2018-10-17 | End: 2018-10-17 | Stop reason: HOSPADM

## 2018-10-17 RX ORDER — OXYCODONE HYDROCHLORIDE AND ACETAMINOPHEN 5; 325 MG/1; MG/1
1 TABLET ORAL EVERY 4 HOURS PRN
Qty: 30 TABLET | Refills: 0 | Status: SHIPPED | OUTPATIENT
Start: 2018-10-17 | End: 2018-10-27

## 2018-10-17 RX ORDER — EPHEDRINE SULFATE 50 MG/ML
INJECTION, SOLUTION INTRAVENOUS AS NEEDED
Status: DISCONTINUED | OUTPATIENT
Start: 2018-10-17 | End: 2018-10-17 | Stop reason: SURG

## 2018-10-17 RX ORDER — SODIUM CHLORIDE, SODIUM LACTATE, POTASSIUM CHLORIDE, CALCIUM CHLORIDE 600; 310; 30; 20 MG/100ML; MG/100ML; MG/100ML; MG/100ML
9 INJECTION, SOLUTION INTRAVENOUS CONTINUOUS
Status: DISCONTINUED | OUTPATIENT
Start: 2018-10-17 | End: 2018-10-17 | Stop reason: HOSPADM

## 2018-10-17 RX ORDER — SODIUM CHLORIDE 9 MG/ML
40 INJECTION, SOLUTION INTRAVENOUS AS NEEDED
Status: DISCONTINUED | OUTPATIENT
Start: 2018-10-17 | End: 2018-10-17 | Stop reason: HOSPADM

## 2018-10-17 RX ORDER — PROMETHAZINE HYDROCHLORIDE 12.5 MG/1
12.5 TABLET ORAL EVERY 6 HOURS PRN
Qty: 10 TABLET | Refills: 0 | Status: SHIPPED | OUTPATIENT
Start: 2018-10-17 | End: 2018-10-30

## 2018-10-17 RX ORDER — HYDROMORPHONE HYDROCHLORIDE 1 MG/ML
0.5 INJECTION, SOLUTION INTRAMUSCULAR; INTRAVENOUS; SUBCUTANEOUS AS NEEDED
Status: DISCONTINUED | OUTPATIENT
Start: 2018-10-17 | End: 2018-10-17 | Stop reason: HOSPADM

## 2018-10-17 RX ORDER — MEPERIDINE HYDROCHLORIDE 25 MG/ML
12.5 INJECTION INTRAMUSCULAR; INTRAVENOUS; SUBCUTANEOUS
Status: DISCONTINUED | OUTPATIENT
Start: 2018-10-17 | End: 2018-10-17 | Stop reason: HOSPADM

## 2018-10-17 RX ORDER — DEXAMETHASONE SODIUM PHOSPHATE 4 MG/ML
8 INJECTION, SOLUTION INTRA-ARTICULAR; INTRALESIONAL; INTRAMUSCULAR; INTRAVENOUS; SOFT TISSUE ONCE
Status: COMPLETED | OUTPATIENT
Start: 2018-10-17 | End: 2018-10-17

## 2018-10-17 RX ORDER — MIDAZOLAM HYDROCHLORIDE 1 MG/ML
1 INJECTION INTRAMUSCULAR; INTRAVENOUS
Status: DISCONTINUED | OUTPATIENT
Start: 2018-10-17 | End: 2018-10-17 | Stop reason: HOSPADM

## 2018-10-17 RX ORDER — ROCURONIUM BROMIDE 10 MG/ML
INJECTION, SOLUTION INTRAVENOUS AS NEEDED
Status: DISCONTINUED | OUTPATIENT
Start: 2018-10-17 | End: 2018-10-17 | Stop reason: SURG

## 2018-10-17 RX ORDER — FENTANYL CITRATE 50 UG/ML
INJECTION, SOLUTION INTRAMUSCULAR; INTRAVENOUS AS NEEDED
Status: DISCONTINUED | OUTPATIENT
Start: 2018-10-17 | End: 2018-10-17 | Stop reason: SURG

## 2018-10-17 RX ORDER — PROPOFOL 10 MG/ML
VIAL (ML) INTRAVENOUS AS NEEDED
Status: DISCONTINUED | OUTPATIENT
Start: 2018-10-17 | End: 2018-10-17 | Stop reason: SURG

## 2018-10-17 RX ORDER — KETOROLAC TROMETHAMINE 30 MG/ML
INJECTION, SOLUTION INTRAMUSCULAR; INTRAVENOUS AS NEEDED
Status: DISCONTINUED | OUTPATIENT
Start: 2018-10-17 | End: 2018-10-17 | Stop reason: SURG

## 2018-10-17 RX ORDER — ONDANSETRON 2 MG/ML
4 INJECTION INTRAMUSCULAR; INTRAVENOUS ONCE AS NEEDED
Status: DISCONTINUED | OUTPATIENT
Start: 2018-10-17 | End: 2018-10-17 | Stop reason: HOSPADM

## 2018-10-17 RX ORDER — LIDOCAINE HYDROCHLORIDE 10 MG/ML
0.5 INJECTION, SOLUTION EPIDURAL; INFILTRATION; INTRACAUDAL; PERINEURAL ONCE AS NEEDED
Status: COMPLETED | OUTPATIENT
Start: 2018-10-17 | End: 2018-10-17

## 2018-10-17 RX ORDER — LIDOCAINE HYDROCHLORIDE 20 MG/ML
INJECTION, SOLUTION INFILTRATION; PERINEURAL AS NEEDED
Status: DISCONTINUED | OUTPATIENT
Start: 2018-10-17 | End: 2018-10-17 | Stop reason: SURG

## 2018-10-17 RX ORDER — OXYCODONE HYDROCHLORIDE AND ACETAMINOPHEN 5; 325 MG/1; MG/1
1 TABLET ORAL ONCE AS NEEDED
Status: COMPLETED | OUTPATIENT
Start: 2018-10-17 | End: 2018-10-17

## 2018-10-17 RX ORDER — CEFAZOLIN SODIUM 2 G/50ML
2 SOLUTION INTRAVENOUS ONCE
Status: COMPLETED | OUTPATIENT
Start: 2018-10-17 | End: 2018-10-17

## 2018-10-17 RX ORDER — ONDANSETRON 2 MG/ML
4 INJECTION INTRAMUSCULAR; INTRAVENOUS ONCE AS NEEDED
Status: COMPLETED | OUTPATIENT
Start: 2018-10-17 | End: 2018-10-17

## 2018-10-17 RX ORDER — MIDAZOLAM HYDROCHLORIDE 1 MG/ML
2 INJECTION INTRAMUSCULAR; INTRAVENOUS
Status: DISCONTINUED | OUTPATIENT
Start: 2018-10-17 | End: 2018-10-17 | Stop reason: HOSPADM

## 2018-10-17 RX ORDER — MAGNESIUM HYDROXIDE 1200 MG/15ML
LIQUID ORAL AS NEEDED
Status: DISCONTINUED | OUTPATIENT
Start: 2018-10-17 | End: 2018-10-17 | Stop reason: HOSPADM

## 2018-10-17 RX ORDER — GLYCOPYRROLATE 0.2 MG/ML
INJECTION INTRAMUSCULAR; INTRAVENOUS AS NEEDED
Status: DISCONTINUED | OUTPATIENT
Start: 2018-10-17 | End: 2018-10-17 | Stop reason: SURG

## 2018-10-17 RX ORDER — SODIUM CHLORIDE, SODIUM LACTATE, POTASSIUM CHLORIDE, CALCIUM CHLORIDE 600; 310; 30; 20 MG/100ML; MG/100ML; MG/100ML; MG/100ML
100 INJECTION, SOLUTION INTRAVENOUS CONTINUOUS
Status: DISCONTINUED | OUTPATIENT
Start: 2018-10-17 | End: 2018-10-17 | Stop reason: HOSPADM

## 2018-10-17 RX ADMIN — FENTANYL CITRATE 50 MCG: 50 INJECTION, SOLUTION INTRAMUSCULAR; INTRAVENOUS at 15:55

## 2018-10-17 RX ADMIN — LIDOCAINE HYDROCHLORIDE 0.5 ML: 10 INJECTION, SOLUTION EPIDURAL; INFILTRATION; INTRACAUDAL; PERINEURAL at 14:23

## 2018-10-17 RX ADMIN — EPHEDRINE SULFATE 10 MG: 50 INJECTION, SOLUTION INTRAVENOUS at 15:46

## 2018-10-17 RX ADMIN — FENTANYL CITRATE 50 MCG: 50 INJECTION, SOLUTION INTRAMUSCULAR; INTRAVENOUS at 15:15

## 2018-10-17 RX ADMIN — PROPOFOL 150 MG: 10 INJECTION, EMULSION INTRAVENOUS at 15:15

## 2018-10-17 RX ADMIN — ONDANSETRON 4 MG: 2 INJECTION, SOLUTION INTRAMUSCULAR; INTRAVENOUS at 14:34

## 2018-10-17 RX ADMIN — SODIUM CHLORIDE, POTASSIUM CHLORIDE, SODIUM LACTATE AND CALCIUM CHLORIDE 9 ML/HR: 600; 310; 30; 20 INJECTION, SOLUTION INTRAVENOUS at 14:23

## 2018-10-17 RX ADMIN — ROCURONIUM BROMIDE 25 MG: 10 INJECTION INTRAVENOUS at 15:15

## 2018-10-17 RX ADMIN — FAMOTIDINE 20 MG: 10 INJECTION, SOLUTION INTRAVENOUS at 14:34

## 2018-10-17 RX ADMIN — OXYCODONE HYDROCHLORIDE AND ACETAMINOPHEN 1 TABLET: 5; 325 TABLET ORAL at 18:01

## 2018-10-17 RX ADMIN — GLYCOPYRROLATE 0.4 MG: 0.2 INJECTION INTRAMUSCULAR; INTRAVENOUS at 16:05

## 2018-10-17 RX ADMIN — NEOSTIGMINE METHYLSULFATE 4 MG: 1 INJECTION, SOLUTION INTRAMUSCULAR; INTRAVENOUS; SUBCUTANEOUS at 16:05

## 2018-10-17 RX ADMIN — CEFAZOLIN SODIUM 2 G: 2 SOLUTION INTRAVENOUS at 15:17

## 2018-10-17 RX ADMIN — KETOROLAC TROMETHAMINE 30 MG: 30 INJECTION INTRAMUSCULAR; INTRAVENOUS at 16:00

## 2018-10-17 RX ADMIN — ROCURONIUM BROMIDE 5 MG: 10 INJECTION INTRAVENOUS at 15:14

## 2018-10-17 RX ADMIN — LIDOCAINE HYDROCHLORIDE 100 MG: 20 INJECTION, SOLUTION INFILTRATION; PERINEURAL at 15:15

## 2018-10-17 RX ADMIN — DEXAMETHASONE SODIUM PHOSPHATE 8 MG: 4 INJECTION, SOLUTION INTRAMUSCULAR; INTRAVENOUS at 14:34

## 2018-10-17 RX ADMIN — EPHEDRINE SULFATE 10 MG: 50 INJECTION, SOLUTION INTRAVENOUS at 15:42

## 2018-10-17 RX ADMIN — MIDAZOLAM HYDROCHLORIDE 2 MG: 1 INJECTION, SOLUTION INTRAMUSCULAR; INTRAVENOUS at 15:06
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
## 2018-10-17 NOTE — OP NOTE
Operative Note, laparoscopic inguinal hernia repair    Ganesh Caban  57 y.o.  1960  female  [id number]    10/17/2018    Pre-operative Diagnosis:left  inguinal hernia     Post-operative Diagnosis: same    Procedure:left Laparoscopic inguinal hernia repair    Surgeon: Jennifer Mo MD     Assistant: Norma Mace    Anesthesia: General    EBL: minimum     Specimens: none    Findings: left inguinal hernia    Indication: Symptomatic inguinal hernia    Description of procedure: In the pre-op area, the patient was interviewed and the correct site was marked.     The patient was taken to the operating room and placed in the supine position.  General anesthesia was administered. A Team Safety Time Out was initiated and completed.  Prepping and draping were performed in the usual sterile fashion.    An infraumbilical incision was performed with an 11 blade scalpel.  The subcutaneous tissue was dissected bluntly with S retractors exposing the left anterior rectus abdominis sheath.  The left rectus abdominis sheeth was incised with an 11 blade scalpel and the anterior rectus abdominis muscle was retracted laterally with a S retractor.  A long balloon dissector was then placed posteriorly to the rectus abdominis muscle.  Under direct visualization, balloon dissection was carried out.  The balloon dissector was then removed and a short balloon trocar was inserted into this space.  This space was then insufflated to 15 mmHg.  Under direct visualization, two 5 mm trochars were placed.  The left inguinal area was inspected and was found to have a indirect nonincarcerated hernia.  The hernia was dissected free from its attachments and sac.   The right inguinal area was inspected and did not reveal any hernias.  A 3 x 6 cm Atrium mesh was placed intra-abdominally.  The mesh was positioned around the left hernia defect and secured inferiorly to Shaun's ligament with a pro-tacker, superiorly to the rectus abdominis muscle, and one  superior lateral on palpation.  Good coverage was accomplished.  Meticulous hemostasis was maintained throughout the procedure.  All trochars were removed under direct visualization without evidence of any trocar site bleeding.  The abdomen was desufflated under direct visualization and there was no movement of the mesh.  All incisions were infiltrated with quarter percent Marcaine and epinephrine and the infraumbilical trocar site was closed in a 2 layer fashion.  Closing the fascia with a figure-of-eight 0 Vicryl and the skin with 4-0 Vicryl.      Sterile dressings were applied and the patient was transferred to recovery room in stable condition.     Jennifer Mo MD  General, Minimally Invasive and Endoscopic Surgery  [facility]    [address], [address]  [place], [place], [address]  P: [phone]  F: [phone]    Cc:  Martinez Ackerman MD

## 2018-10-17 NOTE — DISCHARGE INSTRUCTIONS
LAPAROSCOPIC/OPEN INGUINAL HERNIA REPAIR  POST OP RECOMMENDATIONS  DR. ARIAS  [phone]    ACTIVITIES:  1. Expect to rest most of the day of surgery, but do get up several times daily to reduce the risk of getting a clot in your legs.  2. No strenuous activity or lifting over 10 lbs. until 6 weeks out from surgery.  Try to avoid squatting and deep bends for about a week.  3. Do not drive while on pain medicine.  You must be off pain meds 24 hours before driving.  4. You can climb stairs, but minimize this and initially do one step at a time (both feet on one step rather than going up with each step.)    SYMPTOMS:  1. Scrotum swelling or bruising is common following hernia surgery.  If this occurs, use a jock strap or scrotal support to elevate the scrotum.  Placing a towel under the scrotum while lying down will also work to elevate the scrotum.  Ice packs will help with any discomfort.  2. Constipation is common when taking pain medication for surgery.  Over the counter laxatives, such as Miralax, can be used temporarily.   3. Fatigue and decreased stamina is not unusual for about a week or so after surgery.  Try to take walks and some mild activity between resting.    WOUND SITE:  1. Dressings can be removed 2 days after procedure if desired.  The “tega-derm” may be removed in 2 days if instructed to.  2. Dressings may occasionally have spots of blood on them.  As long as it is dry, these do not need to be changed.  If it is soaked, then the dressing should be removed and a new dressing placed.  3. Skin irritation, redness or itching can prompt removal of the bandage earlier if present.  4. Steri-strips are to be left in place until they fall off on their own in 1-2 weeks.  If they are irritating then they may be removed sooner.  5. Showering may occur while the “tega-derm” is in place.  If it is off, then you must wait 2 days after surgery before showering    MEALS:  1. Eat and Drink very lightly when returning  home following surgery.  Jell-O, ginger ale, chicken noodle soup and crackers are good examples.  The day after surgery you may broaden your diet.  2. Do NOT take pain pills on an empty stomach.    WORK:  1. In general if you have a sedentary job, you can return to work in 7-10 days.  If heavy lifting is required it will be 2-3 weeks.    2. Use discretion and remember that your stamina will be decreased post operatively.  3. Return to work notes can be provided at the time of your post-operative appointment.    FOLLOW UP:  1. Call and make a post-operative appointment for approximately 2 weeks after the procedure.    Jennifer Mo MD  General, Minimally Invasive and Endoscopic Surgery  [facility]    [address], [address]  [place], [place], [address]  P: [phone]  F: [phone]    Cc:  Martinez Ackerman MD

## 2018-10-17 NOTE — ANESTHESIA PREPROCEDURE EVALUATION
Anesthesia Evaluation     Patient summary reviewed and Nursing notes reviewed   history of anesthetic complications: prolonged sedation  NPO Solid Status: > 8 hours  NPO Liquid Status: > 2 hours           Airway   Mallampati: II  TM distance: >3 FB  Neck ROM: full  No difficulty expected  Dental      Pulmonary     breath sounds clear to auscultation  (+) a smoker (quit in early 20s) Former,   Cardiovascular   Exercise tolerance: good (4-7 METS)    Rhythm: regular  Rate: normal    (+) hypertension (pt states it is not always high she has taken herself off meds ) poorly controlled, DVT (LLE),       Neuro/Psych  (+) headaches,     GI/Hepatic/Renal/Endo    (+)  GERD well controlled,      Musculoskeletal (-) negative ROS    Abdominal    Substance History   (+) alcohol use (daily 2 glasses of wine ),      OB/GYN          Other        ROS/Med Hx Other: Cl finished at 1130                  Anesthesia Plan    ASA 2     general     intravenous induction   Anesthetic plan, all risks, benefits, and alternatives have been provided, discussed and informed consent has been obtained with: patient.  Use of blood products discussed with patient  Consented to blood products.

## 2018-10-17 NOTE — ANESTHESIA PROCEDURE NOTES
Airway  Urgency: elective    Date/Time: 10/17/2018 3:19 PM  Airway not difficult    General Information and Staff    Patient location during procedure: OR  Anesthesiologist: SHANT DILLON    Indications and Patient Condition  Indications for airway management: airway protection    Preoxygenated: yes  MILS maintained throughout  Mask difficulty assessment: 1 - vent by mask    Final Airway Details  Final airway type: endotracheal airway      Successful airway: ETT  Cuffed: yes   Successful intubation technique: direct laryngoscopy  Facilitating devices/methods: intubating stylet and anterior pressure/BURP  Endotracheal tube insertion site: oral  Blade: Narayan  Blade size: 2  ETT size: 7.0 mm  Cormack-Lehane Classification: grade IIa - partial view of glottis  Placement verified by: chest auscultation and capnometry   Cuff volume (mL): 5  Measured from: lips  ETT to lips (cm): 21  Number of attempts at approach: 1    Additional Comments  Atraumatic intubation, equal bilateral breath sounds, positive end tidal CO2.

## 2018-10-17 NOTE — ANESTHESIA POSTPROCEDURE EVALUATION
Patient: Ganesh Caban    Procedure Summary     Date:  10/17/18 Room / Location:   [facility] OR 4 /  [facility]    Anesthesia Start:  1510 Anesthesia Stop:  1624    Procedure:  INGUINAL HERNIA REPAIR LAPAROSCOPIC (Left Abdomen) Diagnosis:       Left inguinal hernia      (Left inguinal hernia [K40.90])    Surgeon:  Jennifer Mo MD Provider:  Corie Fowler CRNA    Anesthesia Type:  general ASA Status:  2          Anesthesia Type: general  Last vitals  BP   99/69 (10/17/18 1648)   Temp   96.4 °F (35.8 °C) (10/17/18 1625)   Pulse   72 (10/17/18 1648)   Resp   16 (10/17/18 1642)     SpO2   93 % (10/17/18 1648)     Post Anesthesia Care and Evaluation    Patient location during evaluation: PACU  Patient participation: complete - patient participated  Level of consciousness: awake and alert  Pain score: 1  Pain management: adequate  Airway patency: patent  Anesthetic complications: No anesthetic complications  PONV Status: none  Cardiovascular status: acceptable  Respiratory status: acceptable  Hydration status: acceptable

## 2018-10-30 ENCOUNTER — OFFICE VISIT (OUTPATIENT)
Dept: SURGERY | Facility: CLINIC | Age: 58
End: 2018-10-30

## 2018-10-30 VITALS
OXYGEN SATURATION: 98 % | WEIGHT: 171.3 LBS | SYSTOLIC BLOOD PRESSURE: 138 MMHG | HEIGHT: 65 IN | DIASTOLIC BLOOD PRESSURE: 88 MMHG | HEART RATE: 92 BPM | BODY MASS INDEX: 28.54 KG/M2

## 2018-10-30 DIAGNOSIS — Z09 FOLLOW UP: Primary | ICD-10-CM

## 2018-10-30 PROCEDURE — 99024 POSTOP FOLLOW-UP VISIT: CPT | Performed by: SURGERY

## 2018-11-06 NOTE — TELEPHONE ENCOUNTER
SHE CALLED STATING ONLY HAVE 12 PILLS LEFT//3 DAYS.  WANTS 90 DAY SUPPLY, BUT [facility] WILL TAKE TOO LONG.  WOULD LIKE RX CALLED INTO [facility], SHORT SUPPLY, UNTIL SHE GETS A 90 DAY FROM [facility].

## 2018-11-07 RX ORDER — SUCRALFATE 1 G/1
1 TABLET ORAL
Qty: 360 TABLET | Refills: 3 | Status: SHIPPED | OUTPATIENT
Start: 2018-11-07 | End: 2019-03-07 | Stop reason: SDUPTHER

## 2019-01-04 ENCOUNTER — PREP FOR SURGERY (OUTPATIENT)
Dept: OTHER | Facility: HOSPITAL | Age: 59
End: 2019-01-04

## 2019-01-04 DIAGNOSIS — N81.3 UTEROVAGINAL PROLAPSE, COMPLETE: Primary | ICD-10-CM

## 2019-01-04 PROBLEM — N81.11 CYSTOCELE, MIDLINE: Status: ACTIVE | Noted: 2019-01-04

## 2019-01-04 PROBLEM — R15.0 INCOMPLETE DEFECATION: Status: ACTIVE | Noted: 2019-01-04

## 2019-01-04 PROBLEM — N81.82 WEAKENING OF PUBOCERVICAL TISSUE: Status: ACTIVE | Noted: 2019-01-04

## 2019-01-04 PROBLEM — N81.6 RECTOCELE: Status: ACTIVE | Noted: 2019-01-04

## 2019-01-04 PROBLEM — N39.3 FEMALE STRESS INCONTINENCE: Status: ACTIVE | Noted: 2019-01-04

## 2019-01-04 PROBLEM — N81.5 VAGINAL ENTEROCELE: Status: ACTIVE | Noted: 2019-01-04

## 2019-01-04 RX ORDER — SODIUM CHLORIDE 0.9 % (FLUSH) 0.9 %
3-10 SYRINGE (ML) INJECTION AS NEEDED
Status: CANCELLED | OUTPATIENT
Start: 2019-01-25

## 2019-01-04 RX ORDER — PHENAZOPYRIDINE HYDROCHLORIDE 200 MG/1
200 TABLET, FILM COATED ORAL ONCE
Status: CANCELLED | OUTPATIENT
Start: 2019-01-25 | End: 2019-01-04

## 2019-01-04 RX ORDER — SODIUM CHLORIDE 0.9 % (FLUSH) 0.9 %
3 SYRINGE (ML) INJECTION EVERY 12 HOURS SCHEDULED
Status: CANCELLED | OUTPATIENT
Start: 2019-01-25

## 2019-01-04 RX ORDER — CEFAZOLIN SODIUM 2 G/100ML
2 INJECTION, SOLUTION INTRAVENOUS ONCE
Status: CANCELLED | OUTPATIENT
Start: 2019-01-25

## 2019-01-15 ENCOUNTER — APPOINTMENT (OUTPATIENT)
Dept: PREADMISSION TESTING | Facility: HOSPITAL | Age: 59
End: 2019-01-15

## 2019-01-15 VITALS
SYSTOLIC BLOOD PRESSURE: 100 MMHG | RESPIRATION RATE: 16 BRPM | WEIGHT: 172 LBS | OXYGEN SATURATION: 96 % | DIASTOLIC BLOOD PRESSURE: 70 MMHG | HEIGHT: 65 IN | BODY MASS INDEX: 28.66 KG/M2 | HEART RATE: 92 BPM | TEMPERATURE: 98.1 F

## 2019-01-15 LAB
ANION GAP SERPL CALCULATED.3IONS-SCNC: 18.1 MMOL/L
BUN BLD-MCNC: 13 MG/DL (ref 6–20)
BUN/CREAT SERPL: 19.1 (ref 7–25)
CALCIUM SPEC-SCNC: 9.3 MG/DL (ref 8.6–10.5)
CHLORIDE SERPL-SCNC: 101 MMOL/L (ref 98–107)
CO2 SERPL-SCNC: 21.9 MMOL/L (ref 22–29)
CREAT BLD-MCNC: 0.68 MG/DL (ref 0.57–1)
DEPRECATED RDW RBC AUTO: 43.8 FL (ref 37–54)
ERYTHROCYTE [DISTWIDTH] IN BLOOD BY AUTOMATED COUNT: 12.8 % (ref 11.7–13)
GFR SERPL CREATININE-BSD FRML MDRD: 90 ML/MIN/1.73
GLUCOSE BLD-MCNC: 109 MG/DL (ref 65–99)
HCT VFR BLD AUTO: 42.4 % (ref 35.6–45.5)
HGB BLD-MCNC: 13.9 G/DL (ref 11.9–15.5)
MCH RBC QN AUTO: 30.9 PG (ref 26.9–32)
MCHC RBC AUTO-ENTMCNC: 32.8 G/DL (ref 32.4–36.3)
MCV RBC AUTO: 94.2 FL (ref 80.5–98.2)
PLATELET # BLD AUTO: 320 10*3/MM3 (ref 140–500)
PMV BLD AUTO: 9.9 FL (ref 6–12)
POTASSIUM BLD-SCNC: 3.4 MMOL/L (ref 3.5–5.2)
RBC # BLD AUTO: 4.5 10*6/MM3 (ref 3.9–5.2)
SODIUM BLD-SCNC: 141 MMOL/L (ref 136–145)
WBC NRBC COR # BLD: 9.15 10*3/MM3 (ref 4.5–10.7)

## 2019-01-15 PROCEDURE — 85027 COMPLETE CBC AUTOMATED: CPT | Performed by: OBSTETRICS & GYNECOLOGY

## 2019-01-15 PROCEDURE — 93010 ELECTROCARDIOGRAM REPORT: CPT | Performed by: INTERNAL MEDICINE

## 2019-01-15 PROCEDURE — 93005 ELECTROCARDIOGRAM TRACING: CPT

## 2019-01-15 PROCEDURE — 80048 BASIC METABOLIC PNL TOTAL CA: CPT | Performed by: OBSTETRICS & GYNECOLOGY

## 2019-01-15 PROCEDURE — 36415 COLL VENOUS BLD VENIPUNCTURE: CPT

## 2019-01-15 RX ORDER — RANITIDINE 300 MG/1
300 TABLET ORAL NIGHTLY
COMMUNITY
Start: 2019-01-11 | End: 2019-03-07 | Stop reason: SDUPTHER

## 2019-01-15 NOTE — DISCHARGE INSTRUCTIONS
Take the following medications the morning of surgery with a small sip of water:  LOSARTAN AND PROTONIX      General Instructions:FOLLOW DR KIM INSTRUCTIONS  • Do not eat or drink anything after midnight the night before surgery.  • Infants may have breast milk up to four hours before surgery.  • Infants drinking formula may drink formula up to six hours before surgery.   • Patients who avoid smoking, chewing tobacco and alcohol for 4 weeks prior to surgery have a reduced risk of post-operative complications.  Quit smoking as many days before surgery as you can.  • Do not smoke, use chewing tobacco or drink alcohol the day of surgery.   • If applicable bring your C-PAP/ BI-PAP machine.  • Bring any papers given to you in the doctor’s office.  • Wear clean comfortable clothes and socks.  • Do not wear contact lenses or make-up.  Bring a case for your glasses.   • Bring crutches or walker if applicable.  • Remove all piercings.  Leave jewelry and any other valuables at home.  • Hair extensions with metal clips must be removed prior to surgery.  • The Pre-Admission Testing nurse will instruct you to bring medications if unable to obtain an accurate list in Pre-Admission Testing.        If you were given a blood bank ID arm band remember to bring it with you the day of surgery.    Preventing a Surgical Site Infection:  • For 2 to 3 days before surgery, avoid shaving with a razor because the razor can irritate skin and make it easier to develop an infection.    • Any areas of open skin can increase the risk of a post-operative wound infection by allowing bacteria to enter and travel throughout the body.  Notify your surgeon if you have any skin wounds / rashes even if it is not near the expected surgical site.  The area will need assessed to determine if surgery should be delayed until it is healed.  • The night prior to surgery sleep in a clean bed with clean clothing.  Do not allow pets to sleep with you.  • Shower  on the morning of surgery using a fresh bar of anti-bacterial soap (such as Dial) and clean washcloth.  Dry with a clean towel and dress in clean clothing.  • Ask your surgeon if you will be receiving antibiotics prior to surgery.  • Make sure you, your family, and all healthcare providers clean their hands with soap and water or an alcohol based hand  before caring for you or your wound.    Day of surgery: 1/25/2019 ARRIVAL TIME 5:30 AM  Upon arrival, a Pre-op nurse and Anesthesiologist will review your health history, obtain vital signs, and answer questions you may have.  The only belongings needed at this time will be your home medications and if applicable your C-PAP/BI-PAP machine.  If you are staying overnight your family can leave the rest of your belongings in the car and bring them to your room later.  A Pre-op nurse will start an IV and you may receive medication in preparation for surgery, including something to help you relax.  Your family will be able to see you in the Pre-op area.  While you are in surgery your family should notify the waiting room  if they leave the waiting room area and provide a contact phone number.    Please be aware that surgery does come with discomfort.  We want to make every effort to control your discomfort so please discuss any uncontrolled symptoms with your nurse.   Your doctor will most likely have prescribed pain medications.      If you are going home after surgery you will receive individualized written care instructions before being discharged.  A responsible adult must drive you to and from the hospital on the day of your surgery and stay with you for 24 hours.    If you are staying overnight following surgery, you will be transported to your hospital room following the recovery period.  [facility] has all private rooms.    You have received a list of surgical assistants for your reference.  If you have any questions please call  Pre-Admission Testing at [phone].  Deductibles and co-payments are collected on the day of service. Please be prepared to pay the required co-pay, deductible or deposit on the day of service as defined by your plan.

## 2019-01-25 ENCOUNTER — ANESTHESIA EVENT (OUTPATIENT)
Dept: PERIOP | Facility: HOSPITAL | Age: 59
End: 2019-01-25

## 2019-01-25 ENCOUNTER — HOSPITAL ENCOUNTER (OUTPATIENT)
Facility: HOSPITAL | Age: 59
Discharge: HOME | End: 2019-01-26
Attending: OBSTETRICS & GYNECOLOGY | Admitting: OBSTETRICS & GYNECOLOGY

## 2019-01-25 ENCOUNTER — ANESTHESIA (OUTPATIENT)
Dept: PERIOP | Facility: HOSPITAL | Age: 59
End: 2019-01-25

## 2019-01-25 DIAGNOSIS — R26.2 DIFFICULTY WALKING: Primary | ICD-10-CM

## 2019-01-25 DIAGNOSIS — N81.3 UTEROVAGINAL PROLAPSE, COMPLETE: ICD-10-CM

## 2019-01-25 PROBLEM — N81.2 UTEROVAGINAL PROLAPSE, INCOMPLETE: Status: ACTIVE | Noted: 2019-01-25

## 2019-01-25 LAB
ABO GROUP BLD: NORMAL
BLD GP AB SCN SERPL QL: NEGATIVE
RH BLD: NEGATIVE
T&S EXPIRATION DATE: NORMAL

## 2019-01-25 PROCEDURE — 25010000002 PROPOFOL 10 MG/ML EMULSION: Performed by: NURSE ANESTHETIST, CERTIFIED REGISTERED

## 2019-01-25 PROCEDURE — 25010000002 ONDANSETRON PER 1 MG: Performed by: NURSE ANESTHETIST, CERTIFIED REGISTERED

## 2019-01-25 PROCEDURE — G0378 HOSPITAL OBSERVATION PER HR: HCPCS

## 2019-01-25 PROCEDURE — 25010000002 DEXAMETHASONE PER 1 MG: Performed by: NURSE ANESTHETIST, CERTIFIED REGISTERED

## 2019-01-25 PROCEDURE — 25010000002 KETOROLAC TROMETHAMINE PER 15 MG: Performed by: NURSE ANESTHETIST, CERTIFIED REGISTERED

## 2019-01-25 PROCEDURE — 25010000003 CEFAZOLIN IN DEXTROSE 2-4 GM/100ML-% SOLUTION: Performed by: OBSTETRICS & GYNECOLOGY

## 2019-01-25 PROCEDURE — 25010000002 HYDROMORPHONE PER 4 MG: Performed by: NURSE ANESTHETIST, CERTIFIED REGISTERED

## 2019-01-25 PROCEDURE — 88305 TISSUE EXAM BY PATHOLOGIST: CPT | Performed by: OBSTETRICS & GYNECOLOGY

## 2019-01-25 PROCEDURE — 25010000002 FENTANYL CITRATE (PF) 100 MCG/2ML SOLUTION: Performed by: NURSE ANESTHETIST, CERTIFIED REGISTERED

## 2019-01-25 PROCEDURE — 25010000002 MIDAZOLAM PER 1 MG: Performed by: ANESTHESIOLOGY

## 2019-01-25 PROCEDURE — 88302 TISSUE EXAM BY PATHOLOGIST: CPT | Performed by: OBSTETRICS & GYNECOLOGY

## 2019-01-25 PROCEDURE — 25010000002 HYDROMORPHONE PER 4 MG: Performed by: OBSTETRICS & GYNECOLOGY

## 2019-01-25 PROCEDURE — 25010000002 ONDANSETRON PER 1 MG: Performed by: OBSTETRICS & GYNECOLOGY

## 2019-01-25 PROCEDURE — 25010000002 PHENYLEPHRINE PER 1 ML: Performed by: NURSE ANESTHETIST, CERTIFIED REGISTERED

## 2019-01-25 PROCEDURE — 25010000003 CEFAZOLIN 1-4 GM/50ML-% SOLUTION: Performed by: OBSTETRICS & GYNECOLOGY

## 2019-01-25 PROCEDURE — 86900 BLOOD TYPING SEROLOGIC ABO: CPT | Performed by: OBSTETRICS & GYNECOLOGY

## 2019-01-25 PROCEDURE — 86901 BLOOD TYPING SEROLOGIC RH(D): CPT | Performed by: OBSTETRICS & GYNECOLOGY

## 2019-01-25 PROCEDURE — 86850 RBC ANTIBODY SCREEN: CPT | Performed by: OBSTETRICS & GYNECOLOGY

## 2019-01-25 DEVICE — GRFT TISS STRATTICE FIRM 6X10CM: Type: IMPLANTABLE DEVICE | Site: VAGINA | Status: FUNCTIONAL

## 2019-01-25 RX ORDER — ROCURONIUM BROMIDE 10 MG/ML
INJECTION, SOLUTION INTRAVENOUS AS NEEDED
Status: DISCONTINUED | OUTPATIENT
Start: 2019-01-25 | End: 2019-01-25 | Stop reason: SURG

## 2019-01-25 RX ORDER — ONDANSETRON 4 MG/1
4 TABLET, FILM COATED ORAL EVERY 6 HOURS PRN
Status: DISCONTINUED | OUTPATIENT
Start: 2019-01-25 | End: 2019-01-25

## 2019-01-25 RX ORDER — PROMETHAZINE HYDROCHLORIDE 25 MG/1
25 SUPPOSITORY RECTAL ONCE AS NEEDED
Status: DISCONTINUED | OUTPATIENT
Start: 2019-01-25 | End: 2019-01-25

## 2019-01-25 RX ORDER — MAGNESIUM HYDROXIDE 1200 MG/15ML
LIQUID ORAL AS NEEDED
Status: DISCONTINUED | OUTPATIENT
Start: 2019-01-25 | End: 2019-01-25 | Stop reason: HOSPADM

## 2019-01-25 RX ORDER — ONDANSETRON 2 MG/ML
INJECTION INTRAMUSCULAR; INTRAVENOUS AS NEEDED
Status: DISCONTINUED | OUTPATIENT
Start: 2019-01-25 | End: 2019-01-25 | Stop reason: SURG

## 2019-01-25 RX ORDER — PROMETHAZINE HYDROCHLORIDE 25 MG/ML
12.5 INJECTION, SOLUTION INTRAMUSCULAR; INTRAVENOUS ONCE AS NEEDED
Status: DISCONTINUED | OUTPATIENT
Start: 2019-01-25 | End: 2019-01-25

## 2019-01-25 RX ORDER — FENTANYL CITRATE 50 UG/ML
INJECTION, SOLUTION INTRAMUSCULAR; INTRAVENOUS AS NEEDED
Status: DISCONTINUED | OUTPATIENT
Start: 2019-01-25 | End: 2019-01-25 | Stop reason: SURG

## 2019-01-25 RX ORDER — HYDROCODONE BITARTRATE AND ACETAMINOPHEN 7.5; 325 MG/1; MG/1
1 TABLET ORAL ONCE AS NEEDED
Status: DISCONTINUED | OUTPATIENT
Start: 2019-01-25 | End: 2019-01-25

## 2019-01-25 RX ORDER — FAMOTIDINE 10 MG/ML
20 INJECTION, SOLUTION INTRAVENOUS ONCE
Status: COMPLETED | OUTPATIENT
Start: 2019-01-25 | End: 2019-01-25

## 2019-01-25 RX ORDER — SODIUM CHLORIDE, SODIUM LACTATE, POTASSIUM CHLORIDE, CALCIUM CHLORIDE 600; 310; 30; 20 MG/100ML; MG/100ML; MG/100ML; MG/100ML
9 INJECTION, SOLUTION INTRAVENOUS CONTINUOUS
Status: DISCONTINUED | OUTPATIENT
Start: 2019-01-25 | End: 2019-01-26 | Stop reason: HOSPADM

## 2019-01-25 RX ORDER — ONDANSETRON 2 MG/ML
4 INJECTION INTRAMUSCULAR; INTRAVENOUS ONCE AS NEEDED
Status: DISCONTINUED | OUTPATIENT
Start: 2019-01-25 | End: 2019-01-25

## 2019-01-25 RX ORDER — NALOXONE HCL 0.4 MG/ML
0.1 VIAL (ML) INJECTION
Status: DISCONTINUED | OUTPATIENT
Start: 2019-01-25 | End: 2019-01-26 | Stop reason: HOSPADM

## 2019-01-25 RX ORDER — KETOROLAC TROMETHAMINE 30 MG/ML
INJECTION, SOLUTION INTRAMUSCULAR; INTRAVENOUS AS NEEDED
Status: DISCONTINUED | OUTPATIENT
Start: 2019-01-25 | End: 2019-01-25 | Stop reason: SURG

## 2019-01-25 RX ORDER — HYDROMORPHONE HYDROCHLORIDE 1 MG/ML
0.5 INJECTION, SOLUTION INTRAMUSCULAR; INTRAVENOUS; SUBCUTANEOUS
Status: DISCONTINUED | OUTPATIENT
Start: 2019-01-25 | End: 2019-01-26 | Stop reason: HOSPADM

## 2019-01-25 RX ORDER — MIDAZOLAM HYDROCHLORIDE 1 MG/ML
2 INJECTION INTRAMUSCULAR; INTRAVENOUS
Status: DISCONTINUED | OUTPATIENT
Start: 2019-01-25 | End: 2019-01-25 | Stop reason: HOSPADM

## 2019-01-25 RX ORDER — PROMETHAZINE HYDROCHLORIDE 25 MG/1
25 TABLET ORAL ONCE AS NEEDED
Status: DISCONTINUED | OUTPATIENT
Start: 2019-01-25 | End: 2019-01-25

## 2019-01-25 RX ORDER — SODIUM CHLORIDE 0.9 % (FLUSH) 0.9 %
3 SYRINGE (ML) INJECTION EVERY 12 HOURS SCHEDULED
Status: DISCONTINUED | OUTPATIENT
Start: 2019-01-25 | End: 2019-01-25 | Stop reason: HOSPADM

## 2019-01-25 RX ORDER — BACITRACIN 500 [USP'U]/G
1 OINTMENT OPHTHALMIC 3 TIMES DAILY
Status: COMPLETED | OUTPATIENT
Start: 2019-01-25 | End: 2019-01-26

## 2019-01-25 RX ORDER — PANTOPRAZOLE SODIUM 40 MG/1
40 TABLET, DELAYED RELEASE ORAL 2 TIMES DAILY
Status: DISCONTINUED | OUTPATIENT
Start: 2019-01-25 | End: 2019-01-26 | Stop reason: HOSPADM

## 2019-01-25 RX ORDER — ONDANSETRON 2 MG/ML
4 INJECTION INTRAMUSCULAR; INTRAVENOUS EVERY 4 HOURS PRN
Status: DISCONTINUED | OUTPATIENT
Start: 2019-01-25 | End: 2019-01-26 | Stop reason: HOSPADM

## 2019-01-25 RX ORDER — MIDAZOLAM HYDROCHLORIDE 1 MG/ML
1 INJECTION INTRAMUSCULAR; INTRAVENOUS
Status: DISCONTINUED | OUTPATIENT
Start: 2019-01-25 | End: 2019-01-25 | Stop reason: HOSPADM

## 2019-01-25 RX ORDER — EPHEDRINE SULFATE 50 MG/ML
INJECTION, SOLUTION INTRAVENOUS AS NEEDED
Status: DISCONTINUED | OUTPATIENT
Start: 2019-01-25 | End: 2019-01-25 | Stop reason: SURG

## 2019-01-25 RX ORDER — DIPHENHYDRAMINE HCL 25 MG
25 CAPSULE ORAL
Status: DISCONTINUED | OUTPATIENT
Start: 2019-01-25 | End: 2019-01-25

## 2019-01-25 RX ORDER — NALOXONE HCL 0.4 MG/ML
0.2 VIAL (ML) INJECTION AS NEEDED
Status: DISCONTINUED | OUTPATIENT
Start: 2019-01-25 | End: 2019-01-25

## 2019-01-25 RX ORDER — PROPARACAINE HYDROCHLORIDE 5 MG/ML
2 SOLUTION/ DROPS OPHTHALMIC ONCE
Status: COMPLETED | OUTPATIENT
Start: 2019-01-25 | End: 2019-01-25

## 2019-01-25 RX ORDER — LIDOCAINE HYDROCHLORIDE 20 MG/ML
INJECTION, SOLUTION INFILTRATION; PERINEURAL AS NEEDED
Status: DISCONTINUED | OUTPATIENT
Start: 2019-01-25 | End: 2019-01-25 | Stop reason: SURG

## 2019-01-25 RX ORDER — LIDOCAINE HYDROCHLORIDE 10 MG/ML
0.5 INJECTION, SOLUTION EPIDURAL; INFILTRATION; INTRACAUDAL; PERINEURAL ONCE AS NEEDED
Status: DISCONTINUED | OUTPATIENT
Start: 2019-01-25 | End: 2019-01-25 | Stop reason: HOSPADM

## 2019-01-25 RX ORDER — HYDROMORPHONE HCL 110MG/55ML
PATIENT CONTROLLED ANALGESIA SYRINGE INTRAVENOUS AS NEEDED
Status: DISCONTINUED | OUTPATIENT
Start: 2019-01-25 | End: 2019-01-25 | Stop reason: SURG

## 2019-01-25 RX ORDER — LABETALOL HYDROCHLORIDE 5 MG/ML
5 INJECTION, SOLUTION INTRAVENOUS
Status: DISCONTINUED | OUTPATIENT
Start: 2019-01-25 | End: 2019-01-25

## 2019-01-25 RX ORDER — ONDANSETRON 4 MG/1
4 TABLET, FILM COATED ORAL EVERY 4 HOURS PRN
Status: DISCONTINUED | OUTPATIENT
Start: 2019-01-25 | End: 2019-01-26 | Stop reason: HOSPADM

## 2019-01-25 RX ORDER — KETOROLAC TROMETHAMINE 5 MG/ML
2 SOLUTION OPHTHALMIC 2 TIMES DAILY
Status: DISCONTINUED | OUTPATIENT
Start: 2019-01-25 | End: 2019-01-26 | Stop reason: HOSPADM

## 2019-01-25 RX ORDER — CEFAZOLIN SODIUM 1 G/50ML
1 INJECTION, SOLUTION INTRAVENOUS EVERY 8 HOURS
Status: COMPLETED | OUTPATIENT
Start: 2019-01-25 | End: 2019-01-25

## 2019-01-25 RX ORDER — CEFAZOLIN SODIUM 2 G/100ML
2 INJECTION, SOLUTION INTRAVENOUS ONCE
Status: COMPLETED | OUTPATIENT
Start: 2019-01-25 | End: 2019-01-25

## 2019-01-25 RX ORDER — DOCUSATE SODIUM 100 MG/1
100 CAPSULE, LIQUID FILLED ORAL 2 TIMES DAILY
Status: DISCONTINUED | OUTPATIENT
Start: 2019-01-25 | End: 2019-01-26 | Stop reason: HOSPADM

## 2019-01-25 RX ORDER — ACETAMINOPHEN 325 MG/1
650 TABLET ORAL ONCE AS NEEDED
Status: DISCONTINUED | OUTPATIENT
Start: 2019-01-25 | End: 2019-01-25

## 2019-01-25 RX ORDER — PROPOFOL 10 MG/ML
VIAL (ML) INTRAVENOUS AS NEEDED
Status: DISCONTINUED | OUTPATIENT
Start: 2019-01-25 | End: 2019-01-25 | Stop reason: SURG

## 2019-01-25 RX ORDER — SUCRALFATE 1 G/1
1 TABLET ORAL
Status: DISCONTINUED | OUTPATIENT
Start: 2019-01-25 | End: 2019-01-26 | Stop reason: HOSPADM

## 2019-01-25 RX ORDER — ONDANSETRON 4 MG/1
4 TABLET, ORALLY DISINTEGRATING ORAL EVERY 6 HOURS PRN
Status: DISCONTINUED | OUTPATIENT
Start: 2019-01-25 | End: 2019-01-25

## 2019-01-25 RX ORDER — ACETAMINOPHEN, ASPIRIN AND CAFFEINE 250; 250; 65 MG/1; MG/1; MG/1
2 TABLET, FILM COATED ORAL EVERY 6 HOURS PRN
Status: DISCONTINUED | OUTPATIENT
Start: 2019-01-25 | End: 2019-01-26 | Stop reason: HOSPADM

## 2019-01-25 RX ORDER — BUPIVACAINE HYDROCHLORIDE AND EPINEPHRINE 2.5; 5 MG/ML; UG/ML
INJECTION, SOLUTION INFILTRATION; PERINEURAL AS NEEDED
Status: DISCONTINUED | OUTPATIENT
Start: 2019-01-25 | End: 2019-01-25 | Stop reason: HOSPADM

## 2019-01-25 RX ORDER — ESTRADIOL 0.1 MG/G
CREAM VAGINAL AS NEEDED
Status: DISCONTINUED | OUTPATIENT
Start: 2019-01-25 | End: 2019-01-25 | Stop reason: HOSPADM

## 2019-01-25 RX ORDER — SODIUM CHLORIDE 0.9 % (FLUSH) 0.9 %
1-10 SYRINGE (ML) INJECTION AS NEEDED
Status: DISCONTINUED | OUTPATIENT
Start: 2019-01-25 | End: 2019-01-25 | Stop reason: HOSPADM

## 2019-01-25 RX ORDER — EPHEDRINE SULFATE 50 MG/ML
5 INJECTION, SOLUTION INTRAVENOUS ONCE AS NEEDED
Status: DISCONTINUED | OUTPATIENT
Start: 2019-01-25 | End: 2019-01-25

## 2019-01-25 RX ORDER — HYDROMORPHONE HYDROCHLORIDE 1 MG/ML
0.5 INJECTION, SOLUTION INTRAMUSCULAR; INTRAVENOUS; SUBCUTANEOUS
Status: DISCONTINUED | OUTPATIENT
Start: 2019-01-25 | End: 2019-01-25

## 2019-01-25 RX ORDER — FENTANYL CITRATE 50 UG/ML
50 INJECTION, SOLUTION INTRAMUSCULAR; INTRAVENOUS
Status: DISCONTINUED | OUTPATIENT
Start: 2019-01-25 | End: 2019-01-25

## 2019-01-25 RX ORDER — FAMOTIDINE 20 MG/1
20 TABLET, FILM COATED ORAL DAILY
Status: DISCONTINUED | OUTPATIENT
Start: 2019-01-25 | End: 2019-01-26 | Stop reason: HOSPADM

## 2019-01-25 RX ORDER — PHENAZOPYRIDINE HYDROCHLORIDE 200 MG/1
200 TABLET, FILM COATED ORAL ONCE
Status: COMPLETED | OUTPATIENT
Start: 2019-01-25 | End: 2019-01-25

## 2019-01-25 RX ORDER — DEXAMETHASONE SODIUM PHOSPHATE 10 MG/ML
INJECTION INTRAMUSCULAR; INTRAVENOUS AS NEEDED
Status: DISCONTINUED | OUTPATIENT
Start: 2019-01-25 | End: 2019-01-25 | Stop reason: SURG

## 2019-01-25 RX ORDER — DIPHENHYDRAMINE HCL 25 MG
25 CAPSULE ORAL EVERY 6 HOURS PRN
Status: DISCONTINUED | OUTPATIENT
Start: 2019-01-25 | End: 2019-01-25

## 2019-01-25 RX ORDER — SODIUM CHLORIDE 0.9 % (FLUSH) 0.9 %
3-10 SYRINGE (ML) INJECTION AS NEEDED
Status: DISCONTINUED | OUTPATIENT
Start: 2019-01-25 | End: 2019-01-25 | Stop reason: HOSPADM

## 2019-01-25 RX ORDER — FLUMAZENIL 0.1 MG/ML
0.2 INJECTION INTRAVENOUS AS NEEDED
Status: DISCONTINUED | OUTPATIENT
Start: 2019-01-25 | End: 2019-01-25

## 2019-01-25 RX ORDER — ONDANSETRON 4 MG/1
4 TABLET, ORALLY DISINTEGRATING ORAL EVERY 4 HOURS PRN
Status: DISCONTINUED | OUTPATIENT
Start: 2019-01-25 | End: 2019-01-26 | Stop reason: HOSPADM

## 2019-01-25 RX ORDER — ONDANSETRON 2 MG/ML
4 INJECTION INTRAMUSCULAR; INTRAVENOUS EVERY 6 HOURS PRN
Status: DISCONTINUED | OUTPATIENT
Start: 2019-01-25 | End: 2019-01-25

## 2019-01-25 RX ORDER — DEXTROSE AND SODIUM CHLORIDE 5; .45 G/100ML; G/100ML
100 INJECTION, SOLUTION INTRAVENOUS CONTINUOUS
Status: DISCONTINUED | OUTPATIENT
Start: 2019-01-25 | End: 2019-01-26 | Stop reason: HOSPADM

## 2019-01-25 RX ORDER — CETIRIZINE HYDROCHLORIDE 10 MG/1
10 TABLET ORAL DAILY
Status: DISCONTINUED | OUTPATIENT
Start: 2019-01-25 | End: 2019-01-26 | Stop reason: HOSPADM

## 2019-01-25 RX ORDER — HYDRALAZINE HYDROCHLORIDE 20 MG/ML
5 INJECTION INTRAMUSCULAR; INTRAVENOUS
Status: DISCONTINUED | OUTPATIENT
Start: 2019-01-25 | End: 2019-01-25

## 2019-01-25 RX ORDER — OXYCODONE HYDROCHLORIDE AND ACETAMINOPHEN 5; 325 MG/1; MG/1
1 TABLET ORAL EVERY 4 HOURS PRN
Status: DISCONTINUED | OUTPATIENT
Start: 2019-01-25 | End: 2019-01-26 | Stop reason: HOSPADM

## 2019-01-25 RX ORDER — OXYCODONE AND ACETAMINOPHEN 7.5; 325 MG/1; MG/1
1 TABLET ORAL ONCE AS NEEDED
Status: DISCONTINUED | OUTPATIENT
Start: 2019-01-25 | End: 2019-01-25

## 2019-01-25 RX ADMIN — PHENYLEPHRINE HYDROCHLORIDE 200 MCG: 10 INJECTION INTRAVENOUS at 08:26

## 2019-01-25 RX ADMIN — BACITRACIN 1 APPLICATION: 500 OINTMENT OPHTHALMIC at 20:47

## 2019-01-25 RX ADMIN — KETOROLAC TROMETHAMINE 2 DROP: 5 SOLUTION/ DROPS OPHTHALMIC at 20:47

## 2019-01-25 RX ADMIN — CEFAZOLIN SODIUM 1 G: 1 INJECTION, SOLUTION INTRAVENOUS at 16:27

## 2019-01-25 RX ADMIN — DEXAMETHASONE SODIUM PHOSPHATE 8 MG: 10 INJECTION INTRAMUSCULAR; INTRAVENOUS at 08:04

## 2019-01-25 RX ADMIN — SUCRALFATE 1 G: 1 TABLET ORAL at 18:43

## 2019-01-25 RX ADMIN — ONDANSETRON 4 MG: 2 INJECTION INTRAMUSCULAR; INTRAVENOUS at 18:05

## 2019-01-25 RX ADMIN — FENTANYL CITRATE 50 MCG: 50 INJECTION, SOLUTION INTRAMUSCULAR; INTRAVENOUS at 14:43

## 2019-01-25 RX ADMIN — FENTANYL CITRATE 100 MCG: 50 INJECTION INTRAMUSCULAR; INTRAVENOUS at 07:56

## 2019-01-25 RX ADMIN — SODIUM CHLORIDE, POTASSIUM CHLORIDE, SODIUM LACTATE AND CALCIUM CHLORIDE: 600; 310; 30; 20 INJECTION, SOLUTION INTRAVENOUS at 08:54

## 2019-01-25 RX ADMIN — SUGAMMADEX 160 MG: 100 INJECTION, SOLUTION INTRAVENOUS at 12:57

## 2019-01-25 RX ADMIN — HYDROMORPHONE HYDROCHLORIDE 0.5 MG: 2 INJECTION INTRAMUSCULAR; INTRAVENOUS; SUBCUTANEOUS at 11:54

## 2019-01-25 RX ADMIN — KETOROLAC TROMETHAMINE 2 DROP: 5 SOLUTION/ DROPS OPHTHALMIC at 15:13

## 2019-01-25 RX ADMIN — FAMOTIDINE 20 MG: 20 TABLET, FILM COATED ORAL at 18:43

## 2019-01-25 RX ADMIN — ROCURONIUM BROMIDE 20 MG: 10 INJECTION INTRAVENOUS at 09:40

## 2019-01-25 RX ADMIN — KETOROLAC TROMETHAMINE 30 MG: 30 INJECTION, SOLUTION INTRAMUSCULAR; INTRAVENOUS at 11:57

## 2019-01-25 RX ADMIN — LIDOCAINE HYDROCHLORIDE 100 MG: 20 INJECTION, SOLUTION INFILTRATION; PERINEURAL at 07:56

## 2019-01-25 RX ADMIN — PROPARACAINE HYDROCHLORIDE 2 DROP: 5 SOLUTION/ DROPS OPHTHALMIC at 15:05

## 2019-01-25 RX ADMIN — CEFAZOLIN SODIUM 2 G: 2 INJECTION, SOLUTION INTRAVENOUS at 08:08

## 2019-01-25 RX ADMIN — ONDANSETRON 4 MG: 2 INJECTION, SOLUTION INTRAMUSCULAR; INTRAVENOUS at 23:25

## 2019-01-25 RX ADMIN — DOCUSATE SODIUM 100 MG: 100 CAPSULE, LIQUID FILLED ORAL at 20:47

## 2019-01-25 RX ADMIN — ROCURONIUM BROMIDE 20 MG: 10 INJECTION INTRAVENOUS at 11:43

## 2019-01-25 RX ADMIN — BACITRACIN 1 APPLICATION: 500 OINTMENT OPHTHALMIC at 15:34

## 2019-01-25 RX ADMIN — SODIUM CHLORIDE, POTASSIUM CHLORIDE, SODIUM LACTATE AND CALCIUM CHLORIDE 9 ML/HR: 600; 310; 30; 20 INJECTION, SOLUTION INTRAVENOUS at 07:16

## 2019-01-25 RX ADMIN — PHENAZOPYRIDINE 200 MG: 200 TABLET ORAL at 06:45

## 2019-01-25 RX ADMIN — PANTOPRAZOLE SODIUM 40 MG: 40 TABLET, DELAYED RELEASE ORAL at 20:47

## 2019-01-25 RX ADMIN — EPHEDRINE SULFATE 10 MG: 50 INJECTION INTRAMUSCULAR; INTRAVENOUS; SUBCUTANEOUS at 08:27

## 2019-01-25 RX ADMIN — MIDAZOLAM HYDROCHLORIDE 2 MG: 2 INJECTION, SOLUTION INTRAMUSCULAR; INTRAVENOUS at 07:23

## 2019-01-25 RX ADMIN — HYDROMORPHONE HYDROCHLORIDE 0.5 MG: 1 INJECTION, SOLUTION INTRAMUSCULAR; INTRAVENOUS; SUBCUTANEOUS at 15:45

## 2019-01-25 RX ADMIN — ROCURONIUM BROMIDE 20 MG: 10 INJECTION INTRAVENOUS at 10:50

## 2019-01-25 RX ADMIN — OXYCODONE AND ACETAMINOPHEN 1 TABLET: 5; 325 TABLET ORAL at 17:03

## 2019-01-25 RX ADMIN — PROPOFOL 150 MG: 10 INJECTION, EMULSION INTRAVENOUS at 07:56

## 2019-01-25 RX ADMIN — HYDROMORPHONE HYDROCHLORIDE 0.5 MG: 2 INJECTION INTRAMUSCULAR; INTRAVENOUS; SUBCUTANEOUS at 11:57

## 2019-01-25 RX ADMIN — CEFAZOLIN SODIUM 1 G: 1 INJECTION, SOLUTION INTRAVENOUS at 23:26

## 2019-01-25 RX ADMIN — SUCRALFATE 1 G: 1 TABLET ORAL at 20:47

## 2019-01-25 RX ADMIN — ROCURONIUM BROMIDE 40 MG: 10 INJECTION INTRAVENOUS at 07:56

## 2019-01-25 RX ADMIN — FAMOTIDINE 20 MG: 10 INJECTION, SOLUTION INTRAVENOUS at 07:14

## 2019-01-25 RX ADMIN — CETIRIZINE HYDROCHLORIDE 10 MG: 10 TABLET, FILM COATED ORAL at 18:43

## 2019-01-25 RX ADMIN — HYDROMORPHONE HYDROCHLORIDE 0.5 MG: 1 INJECTION, SOLUTION INTRAMUSCULAR; INTRAVENOUS; SUBCUTANEOUS at 23:23

## 2019-01-25 RX ADMIN — PHENYLEPHRINE HYDROCHLORIDE 200 MCG: 10 INJECTION INTRAVENOUS at 08:10

## 2019-01-25 RX ADMIN — HYDROMORPHONE HYDROCHLORIDE 0.5 MG: 1 INJECTION, SOLUTION INTRAMUSCULAR; INTRAVENOUS; SUBCUTANEOUS at 15:08

## 2019-01-25 RX ADMIN — FENTANYL CITRATE 50 MCG: 50 INJECTION, SOLUTION INTRAMUSCULAR; INTRAVENOUS at 14:03

## 2019-01-25 RX ADMIN — ROCURONIUM BROMIDE 20 MG: 10 INJECTION INTRAVENOUS at 08:52

## 2019-01-25 RX ADMIN — ONDANSETRON 4 MG: 2 INJECTION INTRAMUSCULAR; INTRAVENOUS at 11:57

## 2019-01-25 NOTE — ANESTHESIA PREPROCEDURE EVALUATION
Anesthesia Evaluation     Patient summary reviewed and Nursing notes reviewed   NPO Solid Status: > 8 hours  NPO Liquid Status: > 8 hours           Airway   Mallampati: II  TM distance: <3 FB  Neck ROM: full  no difficulty expected and Possible difficult intubation  Dental - normal exam     Pulmonary - normal exam   Cardiovascular - normal exam    (+) hypertension,       Neuro/Psych  GI/Hepatic/Renal/Endo    (+)  GERD,      Musculoskeletal     Abdominal  - normal exam   Substance History      OB/GYN          Other                        Anesthesia Plan    ASA 2     general     intravenous induction   Anesthetic plan, all risks, benefits, and alternatives have been provided, discussed and informed consent has been obtained with: patient.

## 2019-01-25 NOTE — ANESTHESIA POSTPROCEDURE EVALUATION
Patient: Ganesh Caban    Procedure Summary     Date:  01/25/19 Room / Location:  [facility] OR [address] [place] [address] MAIN OR    Anesthesia Start:  0748 Anesthesia Stop:  1340    Procedures:       TOTAL LAPAROSCOPIC HYSTERECTOMY with bilateral salpingectomy laparoscopic uterosacral ligament colpopexy laparoscopic paravaginal repair laparoscopic abdominal enterocele pubovaginal sling posterior colporrhaphy cystourethroscopy    (N/A Abdomen)      ANTERIOR AND POSTERIOR VAGINAL REPAIR (N/A Vagina) Diagnosis:       Uterovaginal prolapse, complete      (Uterovaginal prolapse, complete [N81.3])    Surgeon:  Shaista Jiménez MD Provider:  Caden Purcell MD    Anesthesia Type:  general ASA Status:  2          Anesthesia Type: general  Last vitals  BP   100/63 (01/25/19 1445)   Temp   36.4 °C (97.6 °F) (01/25/19 1336)   Pulse   100 (01/25/19 1445)   Resp   16 (01/25/19 1445)     SpO2   97 % (01/25/19 1445)     Post Anesthesia Care and Evaluation    Patient location during evaluation: bedside  Patient participation: complete - patient participated  Level of consciousness: awake and alert  Pain management: adequate  Airway patency: patent  Anesthetic complications: No anesthetic complications  PONV Status: none  Cardiovascular status: acceptable  Respiratory status: acceptable  Hydration status: acceptable    Comments: Appears to have  Left corneal abrasion ordered drops and ointment

## 2019-01-25 NOTE — ANESTHESIA PROCEDURE NOTES
Airway  Urgency: elective    Date/Time: 1/25/2019 8:00 AM  Airway not difficult    General Information and Staff    Patient location during procedure: OR  Anesthesiologist: Caden Purcell MD  CRNA: Tyree Syed CRNA    Indications and Patient Condition  Indications for airway management: airway protection    Preoxygenated: yes  Mask difficulty assessment: 1 - vent by mask    Final Airway Details  Final airway type: endotracheal airway      Successful airway: ETT  Cuffed: yes   Successful intubation technique: direct laryngoscopy  Facilitating devices/methods: anterior pressure/BURP  Endotracheal tube insertion site: oral  Blade: Narayan  Blade size: 2  ETT size (mm): 7.0  Cormack-Lehane Classification: grade IIa - partial view of glottis  Placement verified by: chest auscultation and capnometry   Measured from: lips  ETT to lips (cm): 22  Number of attempts at approach: 1    Additional Comments  Pre 02 100%, SIVI, DL x1, atraumatic intubation, BLBS, Positive ETC02.

## 2019-01-26 VITALS
HEART RATE: 76 BPM | HEIGHT: 65 IN | RESPIRATION RATE: 16 BRPM | WEIGHT: 169.75 LBS | BODY MASS INDEX: 28.28 KG/M2 | OXYGEN SATURATION: 98 % | TEMPERATURE: 98.2 F | DIASTOLIC BLOOD PRESSURE: 66 MMHG | SYSTOLIC BLOOD PRESSURE: 100 MMHG

## 2019-01-26 LAB
HCT VFR BLD AUTO: 32.3 % (ref 35.6–45.5)
HGB BLD-MCNC: 10.2 G/DL (ref 11.9–15.5)

## 2019-01-26 PROCEDURE — 97110 THERAPEUTIC EXERCISES: CPT

## 2019-01-26 PROCEDURE — G0378 HOSPITAL OBSERVATION PER HR: HCPCS

## 2019-01-26 PROCEDURE — 25010000002 ENOXAPARIN PER 10 MG: Performed by: OBSTETRICS & GYNECOLOGY

## 2019-01-26 PROCEDURE — 85014 HEMATOCRIT: CPT | Performed by: OBSTETRICS & GYNECOLOGY

## 2019-01-26 PROCEDURE — 85018 HEMOGLOBIN: CPT | Performed by: OBSTETRICS & GYNECOLOGY

## 2019-01-26 PROCEDURE — 97162 PT EVAL MOD COMPLEX 30 MIN: CPT

## 2019-01-26 RX ADMIN — KETOROLAC TROMETHAMINE 2 DROP: 5 SOLUTION/ DROPS OPHTHALMIC at 07:16

## 2019-01-26 RX ADMIN — CETIRIZINE HYDROCHLORIDE 10 MG: 10 TABLET, FILM COATED ORAL at 08:35

## 2019-01-26 RX ADMIN — ENOXAPARIN SODIUM 40 MG: 40 INJECTION SUBCUTANEOUS at 08:35

## 2019-01-26 RX ADMIN — PANTOPRAZOLE SODIUM 40 MG: 40 TABLET, DELAYED RELEASE ORAL at 07:16

## 2019-01-26 RX ADMIN — FAMOTIDINE 20 MG: 20 TABLET, FILM COATED ORAL at 08:35

## 2019-01-26 RX ADMIN — LOSARTAN POTASSIUM: 50 TABLET, FILM COATED ORAL at 08:35

## 2019-01-26 RX ADMIN — OXYCODONE AND ACETAMINOPHEN 1 TABLET: 5; 325 TABLET ORAL at 13:04

## 2019-01-26 RX ADMIN — OXYCODONE AND ACETAMINOPHEN 1 TABLET: 5; 325 TABLET ORAL at 04:35

## 2019-01-26 RX ADMIN — SUCRALFATE 1 G: 1 TABLET ORAL at 06:20

## 2019-01-26 RX ADMIN — SUCRALFATE 1 G: 1 TABLET ORAL at 12:02

## 2019-01-26 RX ADMIN — OXYCODONE AND ACETAMINOPHEN 1 TABLET: 5; 325 TABLET ORAL at 08:41

## 2019-01-26 RX ADMIN — BACITRACIN 1 APPLICATION: 500 OINTMENT OPHTHALMIC at 07:16

## 2019-01-26 RX ADMIN — DEXTROSE AND SODIUM CHLORIDE 100 ML/HR: 5; .45 INJECTION, SOLUTION INTRAVENOUS at 03:35

## 2019-01-26 RX ADMIN — ACETAMINOPHEN, ASPIRIN AND CAFFEINE 2 TABLET: 250; 250; 65 TABLET, FILM COATED ORAL at 04:34

## 2019-01-26 RX ADMIN — DOCUSATE SODIUM 100 MG: 100 CAPSULE, LIQUID FILLED ORAL at 08:35

## 2019-01-28 LAB
CYTO UR: NORMAL
LAB AP CASE REPORT: NORMAL
PATH REPORT.FINAL DX SPEC: NORMAL
PATH REPORT.GROSS SPEC: NORMAL

## 2019-02-07 NOTE — OP NOTE
[facility] MAIN OR     PATIENT NAME:    Ganesh Caban                      :   1960     DATE OF OPERATION:   2019     PREOPERATIVE DIAGNOSES:   1.  Uterovaginal prolapse, N81.2.  2.  Cystocele, N81.11, N81.12   3.  Rectocele, N81.6.   4.  Enterocele, N81.5.   5.  Stress urinary incontinence, N39.3.  6.  Weak pubocervical tissue, N81.82.  7.  Weak rectovaginal tissue, N81.83.  8. Incomplete defecation, R15.0  9. Loss of perineal body, N81.89.       POSTOPERATIVE DIAGNOSES: Same      SURGEON(S): Shaista Jiménez M.D., MSc, FACOG, FACS      ASSISTANT(S): NICHOLE Stiles      PROCEDURE(S) PERFORMED:   1. Laparoscopic uterosacral ligament colpopexy, 97322.   2. Laparoscopic paravaginal repair, 84407.  3. Laparoscopic hysterectomy with bilateral salpingectomy, 30551.        4.   Laparoscopic abdominal enterocele repair, 89349.         5.   Pubovaginal sling, retropubic, porcine, 06936.        6.   Posterior colporrhaphy, 80496.         7.  Insertion of anterior vaginal porcine graft, 68197 add on.        8.  Cystourethroscopy     FINDING(S): On cystourethroscopy following all the procedures, there was bilateral efflux of Pyridium stained urine from both the right and the left ureteral orifices. There were no lesions or foreign material of the bladder or the urethra.  The ovaries both right and left appeared normal and remained in situ.      SPECIMEN(S):   1. Right fallopian tube  2. Left fallopian tube  3. Uterus and cervix 26 grams       DESCRIPTION OF PROCEDURE(S): The patient was taken to the Operating Room with a peripheral IV in place. She underwent the induction of general endotracheal anesthesia, was   prepped and draped in the dorsal lithotomy position in [facility]. Cystourethroscopy showed no lesions or  foreign material of the bladder or the urethra and there was bilateral efflux of Pyridium stained urine            from both the right and the left ureteral orifices. The cystoscope was removed and a Parks catheter was placed and set to straight drainage. A  V-Care was sewn into the uterine cervix for manipulation.   A left upper quadrant skin incision was made, a Veress needle inserted, and a pneumoperitoneum introduced. The Veress needle was removed and a 5 millimeter Optiview was placed in the left upper quadrant. Under direct visualization, an 11 millimeter left lateral and 5 millilter suprapubic, subumbilical, and right lateral ports were placed. The patient was placed in Trendelenburg and the bowel lifted superiorly. The fallopian tubes were removed with the Harmonic scalpel on the right and on the left. The Harmonic scalpel was used to create a bladder flap anteriorly and to skeletonize the uterine arteries bilaterally. The monopolar J-hook cautery was used to completely remove and detach the cervix from the vaginal apex. The uterus with cervix and the fallopian tubes  were passed off the field. Both the right and the left ovaries remained in situ and appeared normal. Hemostasis was excellent. With a bulb suction in the vagina, the apex of the vagina was closed laparoscopically with interrupted 0 Vicryl. With a Breisky retractor in the vagina, the posterior rectovaginal fascia was dissected from the rectovaginal serosa and with a Lucite dilator, the pubocervical fascia was dissected from the pubocervical serosa. With a Briesky retractor in the vagina, the paravaginal tissue on the left and on the right was reattached with #1 Prolene and the uterosacral ligaments were attached to the paravaginal tissue ipsilaterally bilaterally to accomplish the laparoscopic bilateral paravaginal repair. With a Breisky retractor in the vagina, the uterosacral ligaments were placed on stretch and #1 Prolene was used to go  through the right uterosacral ligament, the right rectovaginal fascia, the right pubocervical fascia and held. #1 Prolene was used to go through the left uterosacral ligament, the left rectovaginal fascia, the left pubocervical fascia and both these sutures were tied accomplishing the laparoscopic uterosacral ligament colpopexy.  #1 Prolene was used to go through the abdominal approach of a laparoscopic enterocele repair attaching the superiormost portion of the detached rectovaginal fascia and the pubocervical fascia at the apex of the vagina in the midline to the ipsilateral uterosacral ligament and these sutures were tied laparoscopically. The cystourethroscopy showed no lesions or foreign material of the bladder or the urethra and there was bilateral efflux of Pyridium stained urine from both the right and left ureteral orifices. The cystoscope was removed and a Parks catheter was placed and set to straight drainage. The CO2 gas was allowed to escape and the left 11 millimeter port was closed with Wil Narayan Sure Close with 0 Vicryl. The remainder of the ports were removed under direct visualization and all were hemostatic and these were closed with 4-0 Vicryl. A suburethral incision was made and dissected bilaterally. A sheet of porcine material was used to create a porcine sling. This  retropubic 1 centimeter porcine sling was placed.  Cystourethroscopy with each transvaginal retropubic introducer in place showed no lesions or foreign material of the bladder or the urethra. The pubovaginal sling was adjusted without tension so that a curved Jacobo easily fit between the suburethral tissue and the tape. The excess suprapubic tape ends were trimmed and the skin lifted away. These incisions were closed with 4- 0 Vicryl suture. With a Parks catheter in place the suburethral incision was closed with 2-0 Vicryl and was hemostatic.  A posterior vaginal incision was made and the rectum dissected from the vagina.  The posterior colporrhaphy was performed, plicating the rectovaginal fascia in the midline with 0 Vicryl interrupted sutures. The perineorrhaphy   was performed with interrupted 0 Vicryl. The vaginal epithelium was closed with 2-0 Vicryl.   Cystourethroscopy showed no lesions or foreign material of the bladder or the urethra and there was bilateral efflux of Pyridium stained urine from both the right and the left ureteral orifices. The cystoscope was removed and a Parks catheter was placed and set to straight drainage. An estrogen permeated vaginal pack was placed into the vagina. The patient was taken out of the dorsal lithotomy position, awakened from anesthesia and taken to the Recovery Room in good condition.  There were no complications to the procedures.     COUNTS: All final needle, sponge, and instrument counts were correct.      ESTIMATED BLOOD LOSS:  300 milliliters.      FLUIDS:  1800 milliliters crystalloid.      URINE OUTPUT: 100 milliliters.     COMPLICATION(S): There were no complications to the procedure.         BHAVYA PICHARDO M.D., FACOG, FACS

## 2019-03-07 ENCOUNTER — OFFICE VISIT (OUTPATIENT)
Dept: GASTROENTEROLOGY | Facility: CLINIC | Age: 59
End: 2019-03-07

## 2019-03-07 VITALS
WEIGHT: 170.4 LBS | SYSTOLIC BLOOD PRESSURE: 106 MMHG | DIASTOLIC BLOOD PRESSURE: 68 MMHG | HEIGHT: 65 IN | BODY MASS INDEX: 28.39 KG/M2

## 2019-03-07 DIAGNOSIS — K29.30 CHRONIC SUPERFICIAL GASTRITIS WITHOUT BLEEDING: ICD-10-CM

## 2019-03-07 DIAGNOSIS — K59.04 CHRONIC IDIOPATHIC CONSTIPATION: ICD-10-CM

## 2019-03-07 DIAGNOSIS — K21.00 GASTROESOPHAGEAL REFLUX DISEASE WITH ESOPHAGITIS: Primary | ICD-10-CM

## 2019-03-07 PROCEDURE — 99213 OFFICE O/P EST LOW 20 MIN: CPT | Performed by: INTERNAL MEDICINE

## 2019-03-07 RX ORDER — SUCRALFATE 1 G/1
1 TABLET ORAL
Qty: 360 TABLET | Refills: 3 | Status: SHIPPED | OUTPATIENT
Start: 2019-03-07 | End: 2019-04-04 | Stop reason: SDUPTHER

## 2019-03-07 RX ORDER — RANITIDINE 300 MG/1
300 TABLET ORAL NIGHTLY
Qty: 90 TABLET | Refills: 3 | Status: SHIPPED | OUTPATIENT
Start: 2019-03-07 | End: 2019-04-04 | Stop reason: SDUPTHER

## 2019-03-07 RX ORDER — PANTOPRAZOLE SODIUM 40 MG/1
40 TABLET, DELAYED RELEASE ORAL 2 TIMES DAILY
Qty: 180 TABLET | Refills: 3 | Status: SHIPPED | OUTPATIENT
Start: 2019-03-07 | End: 2019-04-04 | Stop reason: SDUPTHER

## 2019-03-07 NOTE — PROGRESS NOTES
PATIENT INFORMATION  Ganesh Caban       - 1960    CHIEF COMPLAINT  Chief Complaint   Patient presents with   • Heartburn       HISTORY OF PRESENT ILLNESS  Has had hernia repair and an Anterior Posterior repair with her Hysterectomy too. Overll doing well but does notice when she doesn't take her Ranitidine     She also tends toward consstipation especially post op.            REVIEW OF SYSTEMS  Review of Systems   All other systems reviewed and are negative.        ACTIVE PROBLEMS  Patient Active Problem List    Diagnosis   • Chronic superficial gastritis without bleeding [K29.30]   • Gastroesophageal reflux disease with esophagitis [K21.0]   • Uterovaginal prolapse, incomplete [N81.2]   • Uterovaginal prolapse, complete [N81.3]   • Cystocele, midline [N81.11]   • Rectocele [N81.6]   • Vaginal enterocele [N81.5]   • Female stress incontinence [N39.3]   • Incomplete defecation [R15.0]   • Weakening of pubocervical tissue [N81.82]   • Left inguinal hernia [K40.90]         PAST MEDICAL HISTORY  Past Medical History:   Diagnosis Date   • Colon polyp    • Female genital prolapse     sched hysterectomy/bladder repair 2019   • GERD (gastroesophageal reflux disease)    • History of Doshi's esophagus     Dr Robbins   • History of colon polyps    • History of DVT of lower extremity    • History of headache    • Hypertension     h/o, currently only takes med if stressed or feels like bp is up, PCP is aware   • Slow to wake up after anesthesia    • Urinary frequency          SURGICAL HISTORY  Past Surgical History:   Procedure Laterality Date   • ANTERIOR AND POSTERIOR VAGINAL REPAIR N/A 2019    Procedure: ANTERIOR AND POSTERIOR VAGINAL REPAIR;  Surgeon: Shaista Jiménez MD;  Location: [facility] OR;  Service: Gynecology   • BREAST AUGMENTATION     • COLONOSCOPY N/A 3/24/2017    Procedure: COLONOSCOPY; POLYPECTOMY;  Surgeon: Myke Robbisn MD;  Location: [facility] OR;  Service:    •  COLONOSCOPY W/ POLYPECTOMY     • DILATATION AND CURETTAGE     • ENDOSCOPY N/A 3/24/2017    Procedure: ESOPHAGOGASTRODUODENOSCOPY WITH BIOPSIES;  Surgeon: Myke Robbins MD;  Location: [facility] OR;  Service:    • INGUINAL HERNIA REPAIR Left 10/17/2018    Procedure: INGUINAL HERNIA REPAIR LAPAROSCOPIC;  Surgeon: Jennifer Mo MD;  Location: [facility] OR;  Service: General   • TOTAL LAPAROSCOPIC HYSTERECTOMY UTEROSACRAL SUSPENSION N/A 1/25/2019    Procedure: TOTAL LAPAROSCOPIC HYSTERECTOMY with bilateral salpingectomy laparoscopic uterosacral ligament colpopexy laparoscopic paravaginal repair laparoscopic abdominal enterocele pubovaginal sling posterior colporrhaphy cystourethroscopy   ;  Surgeon: Shaista Jiménez MD;  Location: [facility];  Service: Gynecology         FAMILY HISTORY  Family History   Problem Relation Age of Onset   • Prostate cancer Father    • Pancreatic cancer Father    • Colon cancer Neg Hx    • Colon polyps Neg Hx    • Breast cancer Neg Hx    • Ovarian cancer Neg Hx    • Uterine cancer Neg Hx    • Malig Hyperthermia Neg Hx          SOCIAL HISTORY  Social History     Occupational History   • Occupation: [employer]   Tobacco Use   • Smoking status: Never Smoker   • Smokeless tobacco: Never Used   Substance and Sexual Activity   • Alcohol use: Yes     Comment: 1-2 glasses of wine nightly   • Drug use: No   • Sexual activity: Not on file       Debilities/Disabilities Identified: None    Emotional Behavior: Appropriate    CURRENT MEDICATIONS    Current Outpatient Medications:   •  loratadine (CLARITIN) 10 MG tablet, Take 10 mg by mouth Daily., Disp: , Rfl:   •  losartan-hydrochlorothiazide (HYZAAR) 50-12.5 MG per tablet, Take 0.5 tablets by mouth Daily., Disp: , Rfl:   •  Multiple Vitamin (STRESS B PO), Take 1 tablet by mouth Daily., Disp: , Rfl:   •  Multiple Vitamins-Minerals (CENTRUM ADULTS PO), Take 1 tablet by mouth Every Night., Disp: , Rfl:   •  Omega-3 Fatty Acids (FISH OIL) 1000  "MG capsule capsule, Take 1,000 mg by mouth Daily With Breakfast., Disp: , Rfl:   •  pantoprazole (PROTONIX) 40 MG EC tablet, Take 40 mg by mouth 2 (Two) Times a Day., Disp: , Rfl:   •  Probiotic Product (TRUBIOTICS PO), Take 1 capsule by mouth Daily With Breakfast., Disp: , Rfl:   •  raNITIdine (ZANTAC) 300 MG tablet, Take 300 mg by mouth Every Night., Disp: , Rfl:   •  sucralfate (CARAFATE) 1 g tablet, Take 1 tablet by mouth 4 (Four) Times a Day Before Meals & at Bedtime., Disp: 360 tablet, Rfl: 3    ALLERGIES  Tetanus toxoids    VITALS  Vitals:    03/07/19 0955   BP: 106/68   Weight: 77.3 kg (170 lb 6.4 oz)   Height: 165.1 cm (65\")       LAST RESULTS   Admission on 01/25/2019, Discharged on 01/26/2019   Component Date Value Ref Range Status   • ABO Type 01/25/2019 A   Final   • RH type 01/25/2019 Negative   Final   • Antibody Screen 01/25/2019 Negative   Final   • T&S Expiration Date 01/25/2019 1/28/2019 11:59:59 PM   Final   • Case Report 01/25/2019    Final                    Value:Surgical Pathology Report                         Case: [id number]                                  Authorizing Provider:  Shaista Jiménez MD        Collected:           01/25/2019 09:44 AM          Ordering Location:     [facility]  Received:            01/25/2019 02:39 PM                                 MAIN OR                                                                      Pathologist:           Ravi Pena MD                                                         Specimens:   1) - Fallopian Tube, Left                                                                           2) - Fallopian Tube, Right                                                                          3) - Uterus with Cervix                                                                   • Final Diagnosis 01/25/2019    Final                    Value:This result contains rich text formatting which cannot be displayed here.   • " Gross Description 01/25/2019    Final                    Value:This result contains rich text formatting which cannot be displayed here.   • Microscopic Description 01/25/2019    Final                    Value:This result contains rich text formatting which cannot be displayed here.   • Hemoglobin 01/26/2019 10.2* 11.9 - 15.5 g/dL Final   • Hematocrit 01/26/2019 32.3* 35.6 - 45.5 % Final     No results found.    PHYSICAL EXAM  Physical Exam   Constitutional: She is oriented to person, place, and time. She appears well-developed and well-nourished.   HENT:   Head: Normocephalic and atraumatic.   Eyes: Conjunctivae and EOM are normal. Pupils are equal, round, and reactive to light. No scleral icterus.   Neck: Normal range of motion. Neck supple. No thyromegaly present.   Cardiovascular: Normal rate, regular rhythm, normal heart sounds and intact distal pulses. Exam reveals no gallop.   No murmur heard.  Pulmonary/Chest: Effort normal and breath sounds normal. She has no wheezes. She has no rales.   Abdominal: Soft. Bowel sounds are normal. She exhibits no shifting dullness, no distension, no fluid wave, no abdominal bruit, no ascites and no mass. There is no hepatosplenomegaly. There is tenderness in the right upper quadrant, epigastric area and periumbilical area. There is no guarding and negative Morales's sign. Hernia confirmed negative in the ventral area.   Musculoskeletal: Normal range of motion. She exhibits no edema.   Lymphadenopathy:     She has no cervical adenopathy.   Neurological: She is alert and oriented to person, place, and time.   Skin: Skin is warm and dry. No rash noted. She is not diaphoretic. No erythema.   Psychiatric: She has a normal mood and affect. Her behavior is normal.       ASSESSMENT  Diagnoses and all orders for this visit:    Gastroesophageal reflux disease with esophagitis    Chronic superficial gastritis without bleeding    Chronic idiopathic constipation          PLAN  Supportive  care for constipation reviewed    Recall for Colon in 3/2020    Return if symptoms worsen or fail to improve.

## 2019-04-04 RX ORDER — RANITIDINE 300 MG/1
300 TABLET ORAL NIGHTLY
Qty: 90 TABLET | Refills: 3 | Status: ON HOLD | OUTPATIENT
Start: 2019-04-04 | End: 2020-03-17

## 2019-04-04 RX ORDER — PANTOPRAZOLE SODIUM 40 MG/1
40 TABLET, DELAYED RELEASE ORAL 2 TIMES DAILY
Qty: 180 TABLET | Refills: 3 | Status: SHIPPED | OUTPATIENT
Start: 2019-04-04 | End: 2020-03-31

## 2019-04-04 RX ORDER — SUCRALFATE 1 G/1
1 TABLET ORAL
Qty: 360 TABLET | Refills: 3 | Status: ON HOLD | OUTPATIENT
Start: 2019-04-04 | End: 2020-03-17

## 2019-11-20 ENCOUNTER — TRANSCRIBE ORDERS (OUTPATIENT)
Dept: ADMINISTRATIVE | Facility: HOSPITAL | Age: 59
End: 2019-11-20

## 2019-11-20 DIAGNOSIS — Z78.0 MENOPAUSE: Primary | ICD-10-CM

## 2019-12-03 ENCOUNTER — APPOINTMENT (OUTPATIENT)
Dept: BONE DENSITY | Facility: HOSPITAL | Age: 59
End: 2019-12-03

## 2019-12-03 DIAGNOSIS — Z78.0 MENOPAUSE: ICD-10-CM

## 2019-12-03 PROCEDURE — 77080 DXA BONE DENSITY AXIAL: CPT

## 2019-12-19 ENCOUNTER — TRANSCRIBE ORDERS (OUTPATIENT)
Dept: ADMINISTRATIVE | Facility: HOSPITAL | Age: 59
End: 2019-12-19

## 2019-12-19 ENCOUNTER — LAB (OUTPATIENT)
Dept: LAB | Facility: HOSPITAL | Age: 59
End: 2019-12-19

## 2019-12-19 ENCOUNTER — HOSPITAL ENCOUNTER (OUTPATIENT)
Dept: CT IMAGING | Facility: HOSPITAL | Age: 59
Discharge: HOME | End: 2019-12-19
Admitting: FAMILY MEDICINE

## 2019-12-19 DIAGNOSIS — R10.9 ACUTE ABDOMINAL PAIN: Primary | ICD-10-CM

## 2019-12-19 DIAGNOSIS — R10.9 ACUTE ABDOMINAL PAIN: ICD-10-CM

## 2019-12-19 DIAGNOSIS — R10.9 ABDOMINAL PAIN, UNSPECIFIED ABDOMINAL LOCATION: Primary | ICD-10-CM

## 2019-12-19 DIAGNOSIS — R10.9 ABDOMINAL PAIN, UNSPECIFIED ABDOMINAL LOCATION: ICD-10-CM

## 2019-12-19 PROCEDURE — 74177 CT ABD & PELVIS W/CONTRAST: CPT

## 2019-12-19 PROCEDURE — 86677 HELICOBACTER PYLORI ANTIBODY: CPT

## 2019-12-19 PROCEDURE — 0 DIATRIZOATE MEGLUMINE & SODIUM PER 1 ML: Performed by: FAMILY MEDICINE

## 2019-12-19 PROCEDURE — 0 IOPAMIDOL PER 1 ML: Performed by: FAMILY MEDICINE

## 2019-12-19 PROCEDURE — 36415 COLL VENOUS BLD VENIPUNCTURE: CPT

## 2019-12-19 RX ADMIN — DIATRIZOATE MEGLUMINE AND DIATRIZOATE SODIUM 30 ML: 600; 100 SOLUTION ORAL; RECTAL at 16:04

## 2019-12-19 RX ADMIN — IOPAMIDOL 100 ML: 755 INJECTION, SOLUTION INTRAVENOUS at 17:29

## 2019-12-23 ENCOUNTER — LAB (OUTPATIENT)
Dept: LAB | Facility: HOSPITAL | Age: 59
End: 2019-12-23

## 2019-12-23 ENCOUNTER — TELEPHONE (OUTPATIENT)
Dept: GASTROENTEROLOGY | Facility: CLINIC | Age: 59
End: 2019-12-23

## 2019-12-23 DIAGNOSIS — K21.00 REFLUX ESOPHAGITIS: ICD-10-CM

## 2019-12-23 DIAGNOSIS — K59.00 CONSTIPATION, UNSPECIFIED CONSTIPATION TYPE: ICD-10-CM

## 2019-12-23 DIAGNOSIS — R10.9 ABDOMINAL PAIN, UNSPECIFIED ABDOMINAL LOCATION: Primary | ICD-10-CM

## 2019-12-23 DIAGNOSIS — R19.7 DIARRHEA, UNSPECIFIED TYPE: Primary | ICD-10-CM

## 2019-12-23 LAB
ADV 40+41 DNA STL QL NAA+NON-PROBE: NOT DETECTED
ASTRO TYP 1-8 RNA STL QL NAA+NON-PROBE: NOT DETECTED
C CAYETANENSIS DNA STL QL NAA+NON-PROBE: NOT DETECTED
C DIFF GDH STL QL: POSITIVE
CAMPY SP DNA.DIARRHEA STL QL NAA+PROBE: NOT DETECTED
CRYPTOSP STL CULT: NOT DETECTED
E COLI DNA SPEC QL NAA+PROBE: NOT DETECTED
E HISTOLYT AG STL-ACNC: NOT DETECTED
EAEC PAA PLAS AGGR+AATA ST NAA+NON-PRB: NOT DETECTED
EC STX1 + STX2 GENES STL NAA+PROBE: NOT DETECTED
EPEC EAE GENE STL QL NAA+NON-PROBE: NOT DETECTED
ETEC LTA+ST1A+ST1B TOX ST NAA+NON-PROBE: NOT DETECTED
G LAMBLIA DNA SPEC QL NAA+PROBE: NOT DETECTED
H PYLORI IGA SER IA-ACNC: <9 UNITS (ref 0–8.9)
H PYLORI IGG SER IA-ACNC: 0.24 INDEX VALUE (ref 0–0.79)
H PYLORI IGM SER-ACNC: <9 UNITS (ref 0–8.9)
NOROVIRUS GI+II RNA STL QL NAA+NON-PROBE: NOT DETECTED
P SHIGELLOIDES DNA STL QL NAA+PROBE: NOT DETECTED
RV RNA STL NAA+PROBE: NOT DETECTED
SALMONELLA DNA SPEC QL NAA+PROBE: NOT DETECTED
SAPO I+II+IV+V RNA STL QL NAA+NON-PROBE: NOT DETECTED
SHIGELLA SP+EIEC IPAH STL QL NAA+PROBE: NOT DETECTED
V CHOLERAE DNA SPEC QL NAA+PROBE: NOT DETECTED
VIBRIO DNA SPEC NAA+PROBE: NOT DETECTED
YERSINIA STL CULT: NOT DETECTED

## 2019-12-23 PROCEDURE — 87449 NOS EACH ORGANISM AG IA: CPT

## 2019-12-23 PROCEDURE — 0097U HC BIOFIRE FILMARRAY GI PANEL: CPT | Performed by: INTERNAL MEDICINE

## 2019-12-23 PROCEDURE — 87324 CLOSTRIDIUM AG IA: CPT

## 2019-12-23 RX ORDER — DIPHENOXYLATE HYDROCHLORIDE AND ATROPINE SULFATE 2.5; .025 MG/1; MG/1
1 TABLET ORAL 4 TIMES DAILY PRN
Qty: 100 TABLET | Refills: 5 | Status: SHIPPED | OUTPATIENT
Start: 2019-12-23

## 2019-12-23 RX ORDER — VANCOMYCIN HYDROCHLORIDE 250 MG/1
250 CAPSULE ORAL 4 TIMES DAILY
Qty: 56 CAPSULE | Refills: 0 | Status: SHIPPED | OUTPATIENT
Start: 2019-12-23 | End: 2020-01-06

## 2019-12-23 NOTE — TELEPHONE ENCOUNTER
Pt called to complain of persistent, constant diarrhea.  She's not eating anything other than a cup of jello last night.  She has tried Immodium, Pepto-Bismal and Kaopectate with no results.  She said it's just clear liquid at this point.  Please advise.  Pt # [phone].

## 2020-01-11 RX ORDER — VANCOMYCIN HYDROCHLORIDE 250 MG/1
250 CAPSULE ORAL 4 TIMES DAILY
Qty: 56 CAPSULE | Refills: 0 | Status: SHIPPED | OUTPATIENT
Start: 2020-01-11 | End: 2020-03-10

## 2020-01-21 ENCOUNTER — TELEPHONE (OUTPATIENT)
Dept: GASTROENTEROLOGY | Facility: CLINIC | Age: 60
End: 2020-01-21

## 2020-01-21 RX ORDER — CHOLESTYRAMINE 4 G/9G
1 POWDER, FOR SUSPENSION ORAL NIGHTLY
Qty: 60 EACH | Refills: 0 | Status: ON HOLD | OUTPATIENT
Start: 2020-01-21 | End: 2020-03-11

## 2020-01-28 NOTE — TELEPHONE ENCOUNTER
So remind me , she has only taken 2 weeks of Vancomycin ( its all I can see in the chart) and stated on questran after she completed her 2 weeks.   Im fine with retreatring but was concerned she had gone thru 2 courses already??

## 2020-01-30 ENCOUNTER — OFFICE VISIT (OUTPATIENT)
Dept: GASTROENTEROLOGY | Facility: CLINIC | Age: 60
End: 2020-01-30

## 2020-01-30 VITALS — BODY MASS INDEX: 28.76 KG/M2 | WEIGHT: 172.6 LBS | HEIGHT: 65 IN

## 2020-01-30 DIAGNOSIS — Z86.010 PERSONAL HISTORY OF COLONIC POLYPS: ICD-10-CM

## 2020-01-30 DIAGNOSIS — A04.72 COLITIS, CLOSTRIDIUM DIFFICILE: Primary | ICD-10-CM

## 2020-01-30 PROCEDURE — 99213 OFFICE O/P EST LOW 20 MIN: CPT | Performed by: INTERNAL MEDICINE

## 2020-01-30 NOTE — PROGRESS NOTES
PATIENT INFORMATION  Ganesh Caban       - 1960    CHIEF COMPLAINT  Chief Complaint   Patient presents with   • Follow-up     follow up on C-Diff       HISTORY OF PRESENT ILLNESS  Two rounds of Vanco and wasn't doing well but sent in cholestyramine and doubled she probiotic     As of last Friday she has been doing well and is off cholestyramine and just feels better and is done with her vancomycin.     Back to normal BMs and energy          REVIEW OF SYSTEMS  Review of Systems   All other systems reviewed and are negative.        ACTIVE PROBLEMS  Patient Active Problem List    Diagnosis   • Chronic superficial gastritis without bleeding [K29.30]   • Gastroesophageal reflux disease with esophagitis [K21.0]   • Uterovaginal prolapse, incomplete [N81.2]   • Uterovaginal prolapse, complete [N81.3]   • Cystocele, midline [N81.11]   • Rectocele [N81.6]   • Vaginal enterocele [N81.5]   • Female stress incontinence [N39.3]   • Incomplete defecation [R15.0]   • Weakening of pubocervical tissue [N81.82]   • Left inguinal hernia [K40.90]         PAST MEDICAL HISTORY  Past Medical History:   Diagnosis Date   • Colon polyp    • Female genital prolapse     sched hysterectomy/bladder repair 2019   • GERD (gastroesophageal reflux disease)    • History of Doshi's esophagus     Dr Robbins   • History of colon polyps    • History of DVT of lower extremity    • History of headache    • Hypertension     h/o, currently only takes med if stressed or feels like bp is up, PCP is aware   • Slow to wake up after anesthesia    • Urinary frequency          SURGICAL HISTORY  Past Surgical History:   Procedure Laterality Date   • ANTERIOR AND POSTERIOR VAGINAL REPAIR N/A 2019    Procedure: ANTERIOR AND POSTERIOR VAGINAL REPAIR;  Surgeon: Shaista Jiménez MD;  Location: [facility] OR;  Service: Gynecology   • BREAST AUGMENTATION     • COLONOSCOPY N/A 3/24/2017    Procedure: COLONOSCOPY; POLYPECTOMY;  Surgeon: Myke  Amadou Robbins MD;  Location: [facility] OR;  Service:    • COLONOSCOPY W/ POLYPECTOMY     • DILATATION AND CURETTAGE     • ENDOSCOPY N/A 3/24/2017    Procedure: ESOPHAGOGASTRODUODENOSCOPY WITH BIOPSIES;  Surgeon: Myke Robbins MD;  Location: [facility] OR;  Service:    • INGUINAL HERNIA REPAIR Left 10/17/2018    Procedure: INGUINAL HERNIA REPAIR LAPAROSCOPIC;  Surgeon: Jennifer Mo MD;  Location: [facility] OR;  Service: General   • TOTAL LAPAROSCOPIC HYSTERECTOMY UTEROSACRAL SUSPENSION N/A 1/25/2019    Procedure: TOTAL LAPAROSCOPIC HYSTERECTOMY with bilateral salpingectomy laparoscopic uterosacral ligament colpopexy laparoscopic paravaginal repair laparoscopic abdominal enterocele pubovaginal sling posterior colporrhaphy cystourethroscopy   ;  Surgeon: Shaista Jiménez MD;  Location: [facility];  Service: Gynecology         FAMILY HISTORY  Family History   Problem Relation Age of Onset   • Prostate cancer Father    • Pancreatic cancer Father    • Colon cancer Neg Hx    • Colon polyps Neg Hx    • Breast cancer Neg Hx    • Ovarian cancer Neg Hx    • Uterine cancer Neg Hx    • Malig Hyperthermia Neg Hx          SOCIAL HISTORY  Social History     Occupational History   • Occupation: [employer]   Tobacco Use   • Smoking status: Never Smoker   • Smokeless tobacco: Never Used   Substance and Sexual Activity   • Alcohol use: Yes     Comment: 1-2 glasses of wine nightly   • Drug use: No   • Sexual activity: Not on file       Debilities/Disabilities Identified: None    Emotional Behavior: Appropriate    CURRENT MEDICATIONS    Current Outpatient Medications:   •  cholestyramine (QUESTRAN) 4 g packet, Take 1 packet by mouth Every Night., Disp: 60 each, Rfl: 0  •  diphenoxylate-atropine (LOMOTIL) 2.5-0.025 MG per tablet, Take 1 tablet by mouth 4 (Four) Times a Day As Needed for Diarrhea., Disp: 100 tablet, Rfl: 5  •  loratadine (CLARITIN) 10 MG tablet, Take 10 mg by mouth Daily., Disp: , Rfl:   •   "losartan-hydrochlorothiazide (HYZAAR) 50-12.5 MG per tablet, Take 0.5 tablets by mouth Daily., Disp: , Rfl:   •  Multiple Vitamin (STRESS B PO), Take 1 tablet by mouth Daily., Disp: , Rfl:   •  Multiple Vitamins-Minerals (CENTRUM ADULTS PO), Take 1 tablet by mouth Every Night., Disp: , Rfl:   •  Omega-3 Fatty Acids (FISH OIL) 1000 MG capsule capsule, Take 1,000 mg by mouth Daily With Breakfast., Disp: , Rfl:   •  pantoprazole (PROTONIX) 40 MG EC tablet, Take 1 tablet by mouth 2 (Two) Times a Day., Disp: 180 tablet, Rfl: 3  •  Probiotic Product (TRUBIOTICS PO), Take 1 capsule by mouth Daily With Breakfast., Disp: , Rfl:   •  raNITIdine (ZANTAC) 300 MG tablet, Take 1 tablet by mouth Every Night., Disp: 90 tablet, Rfl: 3  •  sucralfate (CARAFATE) 1 g tablet, Take 1 tablet by mouth 4 (Four) Times a Day Before Meals & at Bedtime., Disp: 360 tablet, Rfl: 3  •  vancomycin (VANCOCIN) 250 MG capsule, Take 1 capsule by mouth 4 (Four) Times a Day. Indications: Clostridium Difficile Infection, Disp: 56 capsule, Rfl: 0    ALLERGIES  Tetanus toxoids    VITALS  Vitals:    01/30/20 1125   Weight: 78.3 kg (172 lb 9.6 oz)   Height: 165.1 cm (65\")       LAST RESULTS   Lab on 12/23/2019   Component Date Value Ref Range Status   • C Diff GDH / Toxin 12/23/2019 Positive* Negative Final     No results found.    PHYSICAL EXAM  Physical Exam   Constitutional: She is oriented to person, place, and time. She appears well-developed and well-nourished.   HENT:   Head: Normocephalic and atraumatic.   Eyes: Pupils are equal, round, and reactive to light. Conjunctivae and EOM are normal. No scleral icterus.   Neck: Normal range of motion. Neck supple. No thyromegaly present.   Cardiovascular: Normal rate, regular rhythm, normal heart sounds and intact distal pulses. Exam reveals no gallop.   No murmur heard.  Pulmonary/Chest: Effort normal and breath sounds normal. She has no wheezes. She has no rales.   Abdominal: Soft. Bowel sounds are normal. " She exhibits no shifting dullness, no distension, no fluid wave, no abdominal bruit, no ascites and no mass. There is no hepatosplenomegaly. There is no tenderness. There is no guarding and negative Morales's sign. Hernia confirmed negative in the ventral area.   Musculoskeletal: Normal range of motion. She exhibits no edema.   Lymphadenopathy:     She has no cervical adenopathy.   Neurological: She is alert and oriented to person, place, and time.   Skin: Skin is warm and dry. No rash noted. She is not diaphoretic. No erythema.   Psychiatric: She has a normal mood and affect. Her behavior is normal.       ASSESSMENT  Diagnoses and all orders for this visit:    Colitis, Clostridium difficile    Personal history of colonic polyps  -     Case Request; Standing  -     Case Request    Other orders  -     Follow Anesthesia Guidelines / Standing Orders; Future  -     Obtain Informed Consent; Future  -     Obtain informed consent; Standing  -     Verify bowel prep was successful; Standing  -     Give tap water enema if bowel prep was insufficient; Standing          PLAN  Will schedule her colon but will continue BID Probiotic for the next 2 weeks and then should be OK to proceed with colon for previous polyps    Return if symptoms worsen or fail to improve.

## 2020-01-31 PROBLEM — Z86.0100 PERSONAL HISTORY OF COLONIC POLYPS: Status: ACTIVE | Noted: 2020-01-31

## 2020-01-31 PROBLEM — Z86.010 PERSONAL HISTORY OF COLONIC POLYPS: Status: ACTIVE | Noted: 2020-01-31

## 2020-03-10 ENCOUNTER — ANESTHESIA EVENT (OUTPATIENT)
Dept: PERIOP | Facility: HOSPITAL | Age: 60
End: 2020-03-10

## 2020-03-11 ENCOUNTER — HOSPITAL ENCOUNTER (OUTPATIENT)
Facility: HOSPITAL | Age: 60
Setting detail: HOSPITAL OUTPATIENT SURGERY
Discharge: HOME | End: 2020-03-11
Attending: INTERNAL MEDICINE | Admitting: INTERNAL MEDICINE

## 2020-03-11 ENCOUNTER — ANESTHESIA (OUTPATIENT)
Dept: PERIOP | Facility: HOSPITAL | Age: 60
End: 2020-03-11

## 2020-03-11 VITALS
TEMPERATURE: 98 F | BODY MASS INDEX: 28.69 KG/M2 | OXYGEN SATURATION: 98 % | RESPIRATION RATE: 14 BRPM | HEIGHT: 65 IN | SYSTOLIC BLOOD PRESSURE: 109 MMHG | HEART RATE: 77 BPM | DIASTOLIC BLOOD PRESSURE: 89 MMHG | WEIGHT: 172.2 LBS

## 2020-03-11 DIAGNOSIS — Z86.010 PERSONAL HISTORY OF COLONIC POLYPS: ICD-10-CM

## 2020-03-11 LAB — POTASSIUM BLD-SCNC: 3.8 MMOL/L (ref 3.5–5.2)

## 2020-03-11 PROCEDURE — 45380 COLONOSCOPY AND BIOPSY: CPT | Performed by: INTERNAL MEDICINE

## 2020-03-11 PROCEDURE — 84132 ASSAY OF SERUM POTASSIUM: CPT | Performed by: ANESTHESIOLOGY

## 2020-03-11 PROCEDURE — 88305 TISSUE EXAM BY PATHOLOGIST: CPT | Performed by: INTERNAL MEDICINE

## 2020-03-11 PROCEDURE — 25010000002 PROPOFOL 10 MG/ML EMULSION: Performed by: NURSE ANESTHETIST, CERTIFIED REGISTERED

## 2020-03-11 RX ORDER — PROPOFOL 10 MG/ML
VIAL (ML) INTRAVENOUS AS NEEDED
Status: DISCONTINUED | OUTPATIENT
Start: 2020-03-11 | End: 2020-03-11 | Stop reason: SURG

## 2020-03-11 RX ORDER — MAGNESIUM HYDROXIDE 1200 MG/15ML
LIQUID ORAL AS NEEDED
Status: DISCONTINUED | OUTPATIENT
Start: 2020-03-11 | End: 2020-03-11 | Stop reason: HOSPADM

## 2020-03-11 RX ORDER — GLYCOPYRROLATE 0.2 MG/ML
INJECTION INTRAMUSCULAR; INTRAVENOUS AS NEEDED
Status: DISCONTINUED | OUTPATIENT
Start: 2020-03-11 | End: 2020-03-11 | Stop reason: SURG

## 2020-03-11 RX ORDER — SODIUM CHLORIDE 9 MG/ML
40 INJECTION, SOLUTION INTRAVENOUS AS NEEDED
Status: DISCONTINUED | OUTPATIENT
Start: 2020-03-11 | End: 2020-03-27 | Stop reason: HOSPADM

## 2020-03-11 RX ORDER — LIDOCAINE HYDROCHLORIDE 10 MG/ML
0.5 INJECTION, SOLUTION EPIDURAL; INFILTRATION; INTRACAUDAL; PERINEURAL ONCE AS NEEDED
Status: DISCONTINUED | OUTPATIENT
Start: 2020-03-11 | End: 2020-03-27 | Stop reason: HOSPADM

## 2020-03-11 RX ORDER — LIDOCAINE HYDROCHLORIDE 20 MG/ML
INJECTION, SOLUTION INFILTRATION; PERINEURAL AS NEEDED
Status: DISCONTINUED | OUTPATIENT
Start: 2020-03-11 | End: 2020-03-11 | Stop reason: SURG

## 2020-03-11 RX ORDER — ONDANSETRON 2 MG/ML
4 INJECTION INTRAMUSCULAR; INTRAVENOUS ONCE AS NEEDED
Status: CANCELLED | OUTPATIENT
Start: 2020-03-11

## 2020-03-11 RX ORDER — SODIUM CHLORIDE 0.9 % (FLUSH) 0.9 %
10 SYRINGE (ML) INJECTION AS NEEDED
Status: DISCONTINUED | OUTPATIENT
Start: 2020-03-11 | End: 2020-03-27 | Stop reason: HOSPADM

## 2020-03-11 RX ORDER — SODIUM CHLORIDE, SODIUM LACTATE, POTASSIUM CHLORIDE, CALCIUM CHLORIDE 600; 310; 30; 20 MG/100ML; MG/100ML; MG/100ML; MG/100ML
100 INJECTION, SOLUTION INTRAVENOUS CONTINUOUS
Status: CANCELLED | OUTPATIENT
Start: 2020-03-11

## 2020-03-11 RX ORDER — MEPERIDINE HYDROCHLORIDE 25 MG/ML
12.5 INJECTION INTRAMUSCULAR; INTRAVENOUS; SUBCUTANEOUS
Status: CANCELLED | OUTPATIENT
Start: 2020-03-11 | End: 2020-03-12

## 2020-03-11 RX ORDER — SODIUM CHLORIDE 0.9 % (FLUSH) 0.9 %
10 SYRINGE (ML) INJECTION EVERY 12 HOURS SCHEDULED
Status: DISCONTINUED | OUTPATIENT
Start: 2020-03-11 | End: 2020-03-27 | Stop reason: HOSPADM

## 2020-03-11 RX ORDER — SODIUM CHLORIDE, SODIUM LACTATE, POTASSIUM CHLORIDE, CALCIUM CHLORIDE 600; 310; 30; 20 MG/100ML; MG/100ML; MG/100ML; MG/100ML
9 INJECTION, SOLUTION INTRAVENOUS CONTINUOUS
Status: DISCONTINUED | OUTPATIENT
Start: 2020-03-11 | End: 2020-03-27 | Stop reason: HOSPADM

## 2020-03-11 RX ADMIN — PROPOFOL 100 MG: 10 INJECTION, EMULSION INTRAVENOUS at 12:03

## 2020-03-11 RX ADMIN — SODIUM CHLORIDE, POTASSIUM CHLORIDE, SODIUM LACTATE AND CALCIUM CHLORIDE 9 ML/HR: 600; 310; 30; 20 INJECTION, SOLUTION INTRAVENOUS at 11:53

## 2020-03-11 RX ADMIN — PROPOFOL 50 MG: 10 INJECTION, EMULSION INTRAVENOUS at 12:11

## 2020-03-11 RX ADMIN — PROPOFOL 50 MG: 10 INJECTION, EMULSION INTRAVENOUS at 12:28

## 2020-03-11 RX ADMIN — GLYCOPYRROLATE 0.1 MG: 0.2 INJECTION INTRAMUSCULAR; INTRAVENOUS at 12:01

## 2020-03-11 RX ADMIN — PROPOFOL 100 MG: 10 INJECTION, EMULSION INTRAVENOUS at 12:05

## 2020-03-11 RX ADMIN — PROPOFOL 50 MG: 10 INJECTION, EMULSION INTRAVENOUS at 12:21

## 2020-03-11 RX ADMIN — PROPOFOL 50 MG: 10 INJECTION, EMULSION INTRAVENOUS at 12:16

## 2020-03-11 RX ADMIN — LIDOCAINE HYDROCHLORIDE 100 MG: 20 INJECTION, SOLUTION INFILTRATION; PERINEURAL at 12:02

## 2020-03-11 NOTE — ANESTHESIA PREPROCEDURE EVALUATION
Anesthesia Evaluation     Patient summary reviewed and Nursing notes reviewed   history of anesthetic complications: prolonged sedation  NPO Solid Status: > 8 hours  NPO Liquid Status: > 8 hours           Airway   Mallampati: III  TM distance: >3 FB  Neck ROM: full  Possible difficult intubation and Small opening  Dental - normal exam     Pulmonary - negative pulmonary ROS and normal exam    breath sounds clear to auscultation  Cardiovascular - normal exam  Exercise tolerance: good (4-7 METS)    Rhythm: regular  Rate: normal    (+) hypertension well controlled less than 2 medications,       Neuro/Psych- negative ROS  GI/Hepatic/Renal/Endo    (+)  GERD well controlled,      Musculoskeletal (-) negative ROS    Abdominal  - normal exam   Substance History   (+) alcohol use (dly),      OB/GYN          Other                        Anesthesia Plan    ASA 2     MAC       Use of blood products discussed with patient  Consented to blood products.

## 2020-03-11 NOTE — ANESTHESIA POSTPROCEDURE EVALUATION
Patient: Ganesh Caban    Procedure Summary     Date:  03/11/20 Room / Location:  [facility] ENDOSCOPY 1 /  [facility] OR    Anesthesia Start:  1159 Anesthesia Stop:  1232    Procedure:  COLONOSCOPY (N/A ) Diagnosis:       Personal history of colonic polyps      Colon polyp      Diverticulosis large intestine w/o perforation or abscess w/o bleeding      (Personal history of colonic polyps [Z86.010])    Surgeon:  Myke Robbins MD Provider:  Te Pena CRNA    Anesthesia Type:  MAC ASA Status:  2          Anesthesia Type: MAC    Vitals  Vitals Value Taken Time   /89 3/11/2020  1:00 PM   Temp     Pulse 77 3/11/2020  1:00 PM   Resp 14 3/11/2020  1:00 PM   SpO2 98 % 3/11/2020  1:00 PM           Post Anesthesia Care and Evaluation    Patient location during evaluation: bedside  Patient participation: complete - patient participated  Level of consciousness: awake  Pain score: 0  Airway patency: patent  Anesthetic complications: No anesthetic complications  PONV Status: none  Cardiovascular status: acceptable  Respiratory status: acceptable  Hydration status: acceptable

## 2020-03-11 NOTE — BRIEF OP NOTE
COLONOSCOPY  Progress Note    Ganesh Caban  3/11/2020    Pre-op Diagnosis:   Personal history of colonic polyps [Z86.010]       Post-Op Diagnosis Codes:     * Personal history of colonic polyps [Z86.010]     * Colon polyp [K63.5]     * Diverticulosis large intestine w/o perforation or abscess w/o bleeding [K57.30]    Procedure/CPT® Codes:      Procedure(s):  COLONOSCOPY    Surgeon(s):  Myke Robbins MD    Anesthesia: Monitored Anesthesia Care    Staff:   Circulator: Neris Lezama RN  Scrub Person: Shaista Abraham    Estimated Blood Loss: none    Urine Voided: * No values recorded between 3/11/2020 11:59 AM and 3/11/2020 12:29 PM *    Specimens:                Specimens     ID Source Type Tests Collected By Collected At Frozen?      A Large Intestine, Right / Ascending Colon Polyp · TISSUE PATHOLOGY EXAM   Myke Robibns MD 3/11/20 1210      Description: ASCENDING COLON POLYPS X2    This specimen was not marked as sent.    B Large Intestine, Sigmoid Colon Polyp · TISSUE PATHOLOGY EXAM   Myke Robbins MD 3/11/20 1220      Description: SIGMOID COLON POLYPS X2    This specimen was not marked as sent.    C Large Intestine, Rectum Polyp · TISSUE PATHOLOGY EXAM   Myke Robbins MD 3/11/20 1228      Description: RECTAL POLYP    This specimen was not marked as sent.                Drains:   NG/OG Tube Orogastric 18 Fr Center mouth (Active)       Findings: Colon to TI good Prep  Sigmoid Diverticulosis  Polyps (5) Cold Biopsy    Complications: None      Myke Robbins MD     Date: 3/11/2020  Time: 12:32

## 2020-03-11 NOTE — OP NOTE
COLONOSCOPY  Procedure Report    Patient Name:  Ganesh Caban  YOB: 1960    Date of Surgery:  3/11/2020     Indications:  Previous polyps    Pre-op Diagnosis:   Personal history of colonic polyps [Z86.010]    Post-Op Diagnosis Codes:     * Personal history of colonic polyps [Z86.010]     * Colon polyp [K63.5]     * Diverticulosis large intestine w/o perforation or abscess w/o bleeding [K57.30]         Procedure/CPT® Codes:      Procedure(s):  COLONOSCOPY    Staff:  Surgeon(s):  Myke Robbins MD         Anesthesia: Monitored Anesthesia Care    Estimated Blood Loss: none    Specimens:   ID Type Source Tests Collected by Time   A : ASCENDING COLON POLYPS X2 Polyp Large Intestine, Right / Ascending Colon TISSUE PATHOLOGY EXAM Myke Robbins MD 3/11/2020 1210   B : SIGMOID COLON POLYPS X2 Polyp Large Intestine, Sigmoid Colon TISSUE PATHOLOGY EXAM Myke Robbins MD 3/11/2020 1220   C : RECTAL POLYP Polyp Large Intestine, Rectum TISSUE PATHOLOGY EXAM Myke Robbins MD 3/11/2020 1228       Implants:    Nothing was implanted during the procedure      Description of Procedure: After having signed informed consent, she was brought to the endoscopy suite and placed in left lateral decubitus position, given her IV sedation.  Rectal exam revealed no external lesions, normal anal tone, no rectal mass.  Scope was introduced into the rectum, advanced under direct visualization through a well prepped colon, sigmoid colon, passed scattered small diverticulum, descending colon, through and around the splenic flexure, reduced, advanced through the transverse colon with ease, around the hepatic flexure, reduced in the ascending colon, advanced easily into the cecum.  Cecum was identified by appendiceal orifice and ileocecal valve.  It was well prepped and normal appearing.  Ileocecal valve was intubated and terminal ileum was normal appearing.  The scope was withdrawn  back into the ascending colon in circumferential fashion.  There were 2, 4-5 mm polyps noted in the ascending colon, both biopsied and sent together as ascending colon polyps x2.  Scope was withdrawn around the hepatic flexure and transverse colon.  No mucosal lesions were noted throughout the extent of the transverse colon or descending colon.  In the sigmoid colon, there were 2 sessile polyps noted, removed by cold biopsy forceps and sent together as sigmoid colon polyps x2; they were no larger than 5 mm.  The scope was withdrawn back into the rectum.  There was a hyperplastic appearing rectal polyp noted that was biopsied and sent separately as rectal polyp.  Scope was retroflexed revealing intact dentate line and no further mucosal lesions.  Scope was deretroflexed and withdrawn.  Patient tolerated the procedure very well.          Findings: Colon to TI good Prep  Sigmoid Diverticulosis  Polyps (5) Cold Biopsy    Complications: None    Recommendations: Results to be called      Myke Robbins MD     Date: 3/11/2020  Time: 12:33

## 2020-03-11 NOTE — H&P
Patient Care Team:  Martinez Ackerman MD as PCP - General (Family Medicine)    CHIEF COMPLAINT: Previous Polyps    HISTORY OF PRESENT ILLNESS:    Lasst exam was 2017      Past Medical History:   Diagnosis Date   • Colon polyp    • Female genital prolapse     sched hysterectomy/bladder repair Jan 2019   • GERD (gastroesophageal reflux disease)    • History of Doshi's esophagus     Dr Robbins   • History of colon polyps    • History of DVT of lower extremity    • History of headache    • Hypertension     h/o, currently only takes med if stressed or feels like bp is up, PCP is aware   • Slow to wake up after anesthesia    • Urinary frequency      Past Surgical History:   Procedure Laterality Date   • ANTERIOR AND POSTERIOR VAGINAL REPAIR N/A 1/25/2019    Procedure: ANTERIOR AND POSTERIOR VAGINAL REPAIR;  Surgeon: Shaista Jiménez MD;  Location: [facility] OR;  Service: Gynecology   • BREAST AUGMENTATION     • COLONOSCOPY N/A 3/24/2017    Procedure: COLONOSCOPY; POLYPECTOMY;  Surgeon: Myke Robbins MD;  Location: [facility] OR;  Service:    • COLONOSCOPY W/ POLYPECTOMY     • DILATATION AND CURETTAGE     • ENDOSCOPY N/A 3/24/2017    Procedure: ESOPHAGOGASTRODUODENOSCOPY WITH BIOPSIES;  Surgeon: Myke Robbins MD;  Location: [facility] OR;  Service:    • INGUINAL HERNIA REPAIR Left 10/17/2018    Procedure: INGUINAL HERNIA REPAIR LAPAROSCOPIC;  Surgeon: Jennifer Mo MD;  Location: [facility] OR;  Service: General   • TOTAL LAPAROSCOPIC HYSTERECTOMY UTEROSACRAL SUSPENSION N/A 1/25/2019    Procedure: TOTAL LAPAROSCOPIC HYSTERECTOMY with bilateral salpingectomy laparoscopic uterosacral ligament colpopexy laparoscopic paravaginal repair laparoscopic abdominal enterocele pubovaginal sling posterior colporrhaphy cystourethroscopy   ;  Surgeon: Shaista Jiménez MD;  Location: [facility] OR;  Service: Gynecology     Family History   Problem Relation Age of Onset   • Prostate cancer Father    •  Pancreatic cancer Father    • Colon cancer Neg Hx    • Colon polyps Neg Hx    • Breast cancer Neg Hx    • Ovarian cancer Neg Hx    • Uterine cancer Neg Hx    • Malig Hyperthermia Neg Hx      Social History     Tobacco Use   • Smoking status: Never Smoker   • Smokeless tobacco: Never Used   Substance Use Topics   • Alcohol use: Yes     Comment: 1-2 glasses of wine nightly   • Drug use: No     Medications Prior to Admission   Medication Sig Dispense Refill Last Dose   • diphenoxylate-atropine (LOMOTIL) 2.5-0.025 MG per tablet Take 1 tablet by mouth 4 (Four) Times a Day As Needed for Diarrhea. 100 tablet 5 Past Month at Unknown time   • loratadine (CLARITIN) 10 MG tablet Take 10 mg by mouth Daily.   3/10/2020 at Unknown time   • losartan-hydrochlorothiazide (HYZAAR) 50-12.5 MG per tablet Take 0.5 tablets by mouth Daily.   3/11/2020 at Unknown time   • Multiple Vitamin (STRESS B PO) Take 1 tablet by mouth Daily.   3/10/2020 at Unknown time   • Multiple Vitamins-Minerals (CENTRUM ADULTS PO) Take 1 tablet by mouth Every Night.   3/10/2020 at 1   • pantoprazole (PROTONIX) 40 MG EC tablet Take 1 tablet by mouth 2 (Two) Times a Day. 180 tablet 3 3/11/2020 at Unknown time   • Probiotic Product (TRUBIOTICS PO) Take 1 capsule by mouth Daily With Breakfast.   3/10/2020 at Unknown time   • raNITIdine (ZANTAC) 300 MG tablet Take 1 tablet by mouth Every Night. 90 tablet 3 Past Week at Unknown time   • sucralfate (CARAFATE) 1 g tablet Take 1 tablet by mouth 4 (Four) Times a Day Before Meals & at Bedtime. 360 tablet 3 3/10/2020 at Unknown time   • Omega-3 Fatty Acids (FISH OIL) 1000 MG capsule capsule Take 1,000 mg by mouth Daily With Breakfast.   More than a month at Unknown time     Allergies:  Tetanus toxoids    REVIEW OF SYSTEMS:  Please see the above history of present illness for pertinent positives and negatives.  The remainder of the patient's systems have been reviewed and are negative.     Vital Signs  Temp:  [98 °F (36.7  "°C)] 98 °F (36.7 °C)  Resp:  [10] 10  BP: (113)/(79) 113/79    Flowsheet Rows      First Filed Value   Admission Height  165.1 cm (65\") Documented at 03/10/2020 1322   Admission Weight  78.1 kg (172 lb 3.2 oz) Documented at 03/11/2020 1137           Physical Exam:  Physical Exam   Constitutional: Patient appears well-developed and well-nourished and in no acute distress   HEENT:   Head: Normocephalic and atraumatic.   Eyes:  Pupils are equal, round, and reactive to light. EOM are intact. Sclerae are anicteric and non-injected.  Mouth and Throat: Patient has moist mucous membranes. Oropharynx is clear of any erythema or exudate.     Neck: Neck supple. No JVD present. No thyromegaly present. No lymphadenopathy present.  Cardiovascular: Regular rate, regular rhythm, S1 normal and S2 normal.  Exam reveals no gallop and no friction rub.  No murmur heard.  Pulmonary/Chest: Lungs are clear to auscultation bilaterally. No respiratory distress. No wheezes. No rhonchi. No rales.   Abdominal: Soft. Bowel sounds are normal. No distension and no mass. There is no hepatosplenomegaly. There is no tenderness.   Musculoskeletal: Normal Muscle tone  Extremities: No edema. Pulses are palpable in all 4 extremities.  Neurological: Patient is alert and oriented to person, place, and time. Cranial nerves II-XII are grossly intact with no focal deficits.  Skin: Skin is warm. No rash noted. Nails show no clubbing.  No cyanosis or erythema.    Debilities/Disabilities Identified: None  Emotional Behavior: Appropriate     Results Review:    I reviewed the patient's new clinical results.  Lab Results (most recent)     Procedure Component Value Units Date/Time    Potassium [[id number]] Collected:  03/11/20 1136    Specimen:  Blood Updated:  03/11/20 1150          Imaging Results (Most Recent)     None        reviewed    ECG/EMG Results (most recent)     None        reviewed    Assessment/Plan   PersonalHistory of Colon polyps/ Colonoscopy    I " discussed the patients findings and my recommendations with patient.     Myke Robbins MD  03/11/20  11:55    Time: 10 min prior to procedure.

## 2020-03-17 RX ORDER — SUCRALFATE 1 G/1
TABLET ORAL
Qty: 360 TABLET | Refills: 3 | Status: SHIPPED | OUTPATIENT
Start: 2020-03-17 | End: 2021-03-12

## 2020-03-17 RX ORDER — RANITIDINE 300 MG/1
TABLET ORAL
Qty: 90 TABLET | Refills: 3 | Status: SHIPPED | OUTPATIENT
Start: 2020-03-17 | End: 2020-06-15 | Stop reason: SDUPTHER

## 2020-03-31 RX ORDER — PANTOPRAZOLE SODIUM 40 MG/1
TABLET, DELAYED RELEASE ORAL
Qty: 180 TABLET | Refills: 3 | Status: SHIPPED | OUTPATIENT
Start: 2020-03-31 | End: 2021-03-26

## 2020-06-15 RX ORDER — FAMOTIDINE 40 MG/1
40 TABLET, FILM COATED ORAL DAILY
Qty: 90 TABLET | Refills: 3 | Status: SHIPPED | OUTPATIENT
Start: 2020-06-15 | End: 2020-08-06 | Stop reason: SDUPTHER

## 2020-08-04 ENCOUNTER — TELEPHONE (OUTPATIENT)
Dept: GASTROENTEROLOGY | Facility: CLINIC | Age: 60
End: 2020-08-04

## 2020-08-04 NOTE — TELEPHONE ENCOUNTER
CALL FROM PATIENT.  HAVE BEEN TAKING THE FAMOTIDINE 40 MG AT NIGHT.  STOMACH STILL HURTING, SO 3 WEEKS AGO SHE DOUBLE UP ON THE MEDICATION.  TAKING 80 MG.  WILL BE RUNNING ABOUT SOON. WOULD LIKE TO SPEAK WITH YOU.  PLEASE CALL

## 2020-08-06 RX ORDER — FAMOTIDINE 40 MG/1
40 TABLET, FILM COATED ORAL 2 TIMES DAILY
Qty: 180 TABLET | Refills: 3 | Status: SHIPPED | OUTPATIENT
Start: 2020-08-06 | End: 2021-07-12

## 2021-03-12 RX ORDER — SUCRALFATE 1 G/1
TABLET ORAL
Qty: 360 TABLET | Refills: 3 | Status: SHIPPED | OUTPATIENT
Start: 2021-03-12

## 2021-03-26 RX ORDER — PANTOPRAZOLE SODIUM 40 MG/1
TABLET, DELAYED RELEASE ORAL
Qty: 180 TABLET | Refills: 3 | Status: SHIPPED | OUTPATIENT
Start: 2021-03-26 | End: 2022-03-24

## 2021-07-12 RX ORDER — FAMOTIDINE 40 MG/1
TABLET, FILM COATED ORAL
Qty: 90 TABLET | Refills: 3 | Status: SHIPPED | OUTPATIENT
Start: 2021-07-12 | End: 2022-07-05

## 2022-03-24 RX ORDER — PANTOPRAZOLE SODIUM 40 MG/1
TABLET, DELAYED RELEASE ORAL
Qty: 180 TABLET | Refills: 3 | Status: SHIPPED | OUTPATIENT
Start: 2022-03-24

## 2022-05-26 ENCOUNTER — TRANSCRIBE ORDERS (OUTPATIENT)
Dept: ADMINISTRATIVE | Facility: HOSPITAL | Age: 62
End: 2022-05-26

## 2022-05-26 DIAGNOSIS — M54.16 LUMBAR RADICULOPATHY: Primary | ICD-10-CM

## 2022-06-16 ENCOUNTER — HOSPITAL ENCOUNTER (OUTPATIENT)
Dept: MRI IMAGING | Facility: HOSPITAL | Age: 62
Discharge: HOME | End: 2022-06-16
Admitting: FAMILY MEDICINE

## 2022-06-16 DIAGNOSIS — M54.16 LUMBAR RADICULOPATHY: ICD-10-CM

## 2022-06-16 PROCEDURE — 72148 MRI LUMBAR SPINE W/O DYE: CPT

## 2022-07-05 RX ORDER — FAMOTIDINE 40 MG/1
TABLET, FILM COATED ORAL
Qty: 90 TABLET | Refills: 3 | Status: SHIPPED | OUTPATIENT
Start: 2022-07-05

## 2023-02-13 ENCOUNTER — TRANSCRIBE ORDERS (OUTPATIENT)
Dept: ADMINISTRATIVE | Facility: HOSPITAL | Age: 63
End: 2023-02-13
Payer: COMMERCIAL

## 2023-02-13 DIAGNOSIS — Z12.31 BREAST CANCER SCREENING BY MAMMOGRAM: Primary | ICD-10-CM

## 2023-02-27 ENCOUNTER — TELEPHONE (OUTPATIENT)
Dept: GASTROENTEROLOGY | Facility: CLINIC | Age: 63
End: 2023-02-27
Payer: COMMERCIAL

## 2023-02-27 NOTE — TELEPHONE ENCOUNTER
PT stated she called last week for her paperwork for a colonoscopy and she hasn't gotten the forms

## 2023-03-20 RX ORDER — PANTOPRAZOLE SODIUM 40 MG/1
TABLET, DELAYED RELEASE ORAL
Qty: 180 TABLET | Refills: 3 | OUTPATIENT
Start: 2023-03-20

## 2023-03-30 ENCOUNTER — HOSPITAL ENCOUNTER (OUTPATIENT)
Dept: MAMMOGRAPHY | Facility: HOSPITAL | Age: 63
Discharge: HOME | End: 2023-03-30
Payer: COMMERCIAL

## 2023-03-30 ENCOUNTER — APPOINTMENT (OUTPATIENT)
Dept: OTHER | Facility: HOSPITAL | Age: 63
End: 2023-03-30
Payer: COMMERCIAL

## 2023-03-30 DIAGNOSIS — Z00.6 ENCOUNTER FOR EXAMINATION FOR NORMAL COMPARISON AND CONTROL IN CLINICAL RESEARCH PROGRAM: ICD-10-CM

## 2023-03-30 DIAGNOSIS — Z12.31 BREAST CANCER SCREENING BY MAMMOGRAM: ICD-10-CM

## 2023-03-30 PROCEDURE — 77067 SCR MAMMO BI INCL CAD: CPT

## 2023-03-30 PROCEDURE — 77063 BREAST TOMOSYNTHESIS BI: CPT

## 2023-06-04 ENCOUNTER — HOSPITAL ENCOUNTER (EMERGENCY)
Facility: HOSPITAL | Age: 63
Discharge: HOME | End: 2023-06-04
Attending: EMERGENCY MEDICINE | Admitting: EMERGENCY MEDICINE
Payer: COMMERCIAL

## 2023-06-04 VITALS
OXYGEN SATURATION: 100 % | HEART RATE: 78 BPM | WEIGHT: 180 LBS | HEIGHT: 65 IN | BODY MASS INDEX: 29.99 KG/M2 | TEMPERATURE: 98.2 F | RESPIRATION RATE: 22 BRPM | SYSTOLIC BLOOD PRESSURE: 171 MMHG | DIASTOLIC BLOOD PRESSURE: 106 MMHG
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
## 2023-06-04 PROCEDURE — 96365 THER/PROPH/DIAG IV INF INIT: CPT

## 2023-06-04 PROCEDURE — 25010000002 PIPERACILLIN SOD-TAZOBACTAM PER 1 G: Performed by: EMERGENCY MEDICINE

## 2023-06-04 PROCEDURE — 99282 EMERGENCY DEPT VISIT SF MDM: CPT

## 2023-06-04 RX ORDER — AMOXICILLIN AND CLAVULANATE POTASSIUM 875; 125 MG/1; MG/1
1 TABLET, FILM COATED ORAL 2 TIMES DAILY
Qty: 20 TABLET | Refills: 0 | Status: SHIPPED | OUTPATIENT
Start: 2023-06-04 | End: 2023-06-14

## 2023-06-04 RX ORDER — AMOXICILLIN AND CLAVULANATE POTASSIUM 875; 125 MG/1; MG/1
1 TABLET, FILM COATED ORAL 2 TIMES DAILY
Qty: 20 TABLET | Refills: 0 | Status: SHIPPED | OUTPATIENT
Start: 2023-06-04 | End: 2023-06-04 | Stop reason: SDUPTHER

## 2023-06-04 RX ADMIN — PIPERACILLIN SODIUM AND TAZOBACTAM SODIUM 3.38 G: 3; .375 INJECTION, POWDER, LYOPHILIZED, FOR SOLUTION INTRAVENOUS at 16:13

## 2023-06-04 NOTE — ED PROVIDER NOTES
Subjective   History of Present Illness  62-year-old female past medical history significant for GERD Doshi's esophagus hypertension who presents emergency room complaining of wound to her left middle finger from 2 days ago.  Patient was trying to separate her 2 pet dogs were fighting and was bitten.  She has been placing hydrogen peroxide on the wound since then but went to an urgent treatment center today for possible tetanus shot was told that they could not give her a tetanus shot because she was allergic to it.  Patient states that she is sure that her primary care doctor Dr. Ackerman gave her a tetanus shot in 2008 and that she did not have any allergic reaction at that time.  Patient states that she noticed the wound started smelling today.  Patient denies any other medical allergies.  Patient has no other complaints.    Review of Systems   Constitutional:  Negative for activity change, appetite change, chills, diaphoresis, fatigue and fever.   HENT:  Negative for congestion, rhinorrhea and sore throat.    Eyes:  Negative for photophobia and visual disturbance.   Respiratory:  Negative for cough and shortness of breath.    Cardiovascular:  Negative for chest pain, palpitations and leg swelling.   Gastrointestinal:  Negative for abdominal distention, abdominal pain, diarrhea, nausea and vomiting.   Genitourinary:  Negative for dysuria and flank pain.   Musculoskeletal:  Negative for arthralgias and back pain.   Skin:  Positive for wound. Negative for rash.   Neurological:  Negative for dizziness, weakness and headaches.   Psychiatric/Behavioral:  Negative for agitation and behavioral problems.      Past Medical History:   Diagnosis Date    Colon polyp     Female genital prolapse     sched hysterectomy/bladder repair Jan 2019    GERD (gastroesophageal reflux disease)     History of Doshi's esophagus     Dr Robbins    History of colon polyps     History of DVT of lower extremity     History of headache      Hypertension     h/o, currently only takes med if stressed or feels like bp is up, PCP is aware    Slow to wake up after anesthesia     Urinary frequency        Allergies   Allergen Reactions    Tetanus Toxoids Unknown (See Comments)     ARM SWELLED       Past Surgical History:   Procedure Laterality Date    ANTERIOR AND POSTERIOR VAGINAL REPAIR N/A 1/25/2019    Procedure: ANTERIOR AND POSTERIOR VAGINAL REPAIR;  Surgeon: Shaista Jiménez MD;  Location: [facility];  Service: Gynecology    BREAST AUGMENTATION      COLONOSCOPY N/A 3/24/2017    Procedure: COLONOSCOPY; POLYPECTOMY;  Surgeon: Myke Robbins MD;  Location: [facility] OR;  Service:     COLONOSCOPY N/A 3/11/2020    Procedure: COLONOSCOPY;  Surgeon: Myke Robbins MD;  Location: [facility] OR;  Service: Gastroenterology;  Laterality: N/A;  ASCENDING COLON POLYPS X2  DIVERTICULOSIS  CECAL TIME AT 1210  SIGMOID POLYPS X2  RECTAL POLYP    COLONOSCOPY W/ POLYPECTOMY      DILATATION AND CURETTAGE      ENDOSCOPY N/A 3/24/2017    Procedure: ESOPHAGOGASTRODUODENOSCOPY WITH BIOPSIES;  Surgeon: Myke Robbins MD;  Location: [facility] OR;  Service:     INGUINAL HERNIA REPAIR Left 10/17/2018    Procedure: INGUINAL HERNIA REPAIR LAPAROSCOPIC;  Surgeon: Jennifer Mo MD;  Location: [facility] OR;  Service: General    TOTAL LAPAROSCOPIC HYSTERECTOMY UTEROSACRAL SUSPENSION N/A 1/25/2019    Procedure: TOTAL LAPAROSCOPIC HYSTERECTOMY with bilateral salpingectomy laparoscopic uterosacral ligament colpopexy laparoscopic paravaginal repair laparoscopic abdominal enterocele pubovaginal sling posterior colporrhaphy cystourethroscopy   ;  Surgeon: Shaista Jiménez MD;  Location: [facility];  Service: Gynecology       Family History   Problem Relation Age of Onset    Prostate cancer Father     Pancreatic cancer Father     Colon cancer Neg Hx     Colon polyps Neg Hx     Breast cancer Neg Hx     Ovarian cancer Neg Hx     Uterine cancer Neg Hx      Malig Hyperthermia Neg Hx        Social History     Socioeconomic History    Marital status:    Tobacco Use    Smoking status: Never    Smokeless tobacco: Never   Substance and Sexual Activity    Alcohol use: Yes     Comment: 1-2 glasses of wine nightly    Drug use: No    Sexual activity: Defer           Objective   Physical Exam  Vitals and nursing note reviewed.   Constitutional:       General: She is not in acute distress.     Appearance: Normal appearance. She is not ill-appearing, toxic-appearing or diaphoretic.   HENT:      Head: Normocephalic and atraumatic.      Nose: Nose normal.      Mouth/Throat:      Mouth: Mucous membranes are moist.   Eyes:      Conjunctiva/sclera: Conjunctivae normal.   Cardiovascular:      Rate and Rhythm: Normal rate.      Pulses: Normal pulses.   Pulmonary:      Effort: Pulmonary effort is normal.   Abdominal:      General: Abdomen is flat. There is no distension.      Tenderness: There is no abdominal tenderness.   Musculoskeletal:         General: No swelling. Normal range of motion.        Hands:       Cervical back: Normal range of motion and neck supple.      Comments: Patient has a laceration on the palmar surface of her left middle finger in the area shown on diagram.  There is a 1.5 cm flap overlying the laceration.  The wound is tender with no purulent drainage but the flap is developing a black eschar in the wound has a gangrenous smell.  The finger is not overly edematous or erythematous and there is no streaking into the palmar surface of her hand or dorsal surface of her hand or into her wrist or forearm.  Patient has reasonable range of motion only limited by the pain moving of the laceration.  Patient is neurovascular intact distally   Skin:     General: Skin is warm and dry.   Neurological:      General: No focal deficit present.      Mental Status: She is alert and oriented to person, place, and time.   Psychiatric:         Mood and Affect: Mood normal.        Procedures           ED Course  ED Course as of 06/04/23 1954   [place] Jun 04, 2023   8068 Call Dr. Ackerman cell phone to see if he could clarify tetanus prophylaxis.  Left voicemail.  We will give IV Zosyn in the emergency room today and soak and clean wound.  Patient will most likely need to follow-up with [facility] and will make sure that they have information to call in the morning. []   1555 No answer from Dr. Ackerman.  Discussed with patient the need to follow-up with [facility] tomorrow which they state they will do.  In addition we will give antibiotics p.o. Augmentin.  Patient is going to also call Dr. Ackerman office tomorrow to further assess whether he gave her tetanus prophylaxis in 2008.  Patient can wait a couple days to get her tetanus prophylaxis without concern which I feel outweighs that []   1953 Spoke with Dr. Ackerman who checked patient's records and stated that he did give tetanus prophylaxis in 2010 and patient had no adverse reactions.  I then called patient's home number that was on record and left message on her voicemail that she does need a tetanus prophylaxis and she is able to take it without fear of allergy. []      ED Course User Index  [] Isaac Bennett MD                                           Medical Decision Making  My differential diagnosis of the upper extremity pain or injury includes but is not limited to contusions of the shoulder, forearm, arm, wrist, elbow or hand, dislocations of shoulder, elbow, wrist, digits, nursemaid's elbow (age-appropriate), shoulder sprain, elbow sprain, wrist sprain, digit sprain, shoulder strain, arm strain, forearm strain, elbow strain, wrist strain, hand sprain, digit strain, lacerations of the upper extremity, fractures both closed and open of clavicle, scapula, humerus, radius, ulna, bones of the wrist, hands and digits, cellulitis or abscess, cervical radiculopathy, radial nerve  palsy, neurogenic upper extremity pain, angina equivalent, SVT, DVT, arterial occlusion, compartment syndrome.     Problems Addressed:  Dog bite, initial encounter: complicated acute illness or injury    Risk  Prescription drug management.        Final diagnoses:   Dog bite, initial encounter       ED Disposition  ED Disposition       ED Disposition   Discharge    Condition   Stable    Comment   --               [facility]  [address]  [facility] [address]  [phone]  Schedule an appointment as soon as possible for a visit in 1 day      [facility]  [address] [facility] [address] [phone]  Go to   As needed         Medication List        New Prescriptions      amoxicillin-clavulanate 875-125 MG per tablet  Commonly known as: AUGMENTIN  Take 1 tablet by mouth 2 (Two) Times a Day for 10 days.               Where to Get Your Medications        These medications were sent to [place], [place] - [address] - [phone]  - [phone]   [address] [facility] [address]      Phone: [phone]   amoxicillin-clavulanate 875-125 MG per tablet            Isaac Bennett MD  06/04/23 1726       Isaac Bennett MD  06/04/23 9372

## 2023-07-28 ENCOUNTER — TELEPHONE (OUTPATIENT)
Dept: GASTROENTEROLOGY | Facility: CLINIC | Age: 63
End: 2023-07-28
Payer: COMMERCIAL

## 2023-07-28 NOTE — TELEPHONE ENCOUNTER
PT HAS APPT SCHEDULED ON 08/03    ON RX   pantoprazole (PROTONIX) 40 MG EC tablet [[id number]] (Order [id number])     PT RAN OUT OF HER MEDS. SHE IS WONDERING IF WE CAN CALL IN REFILLS SINCE SHE HAS APPT SCHEDULED.    HOWEVER, SINCE SHE USES [facility], IT WON'T COME IN TIME. SHE WAS WONDERING IF SHE COULD    1)REQUEST JUST A 4 DAY WORTH RX SENT TO HER LOCAL PHARM ([facility] [place])?    2) GET A SAMPLE?    3) OR CAN SHE TAKE EXTRA OF HER FAMOTIDINE?       PLEASE CALL HER BACK AND LET HER KNOW WHAT SHE NEEDS OR CAN DO.    [phone]

## 2023-08-03 ENCOUNTER — OFFICE VISIT (OUTPATIENT)
Dept: GASTROENTEROLOGY | Facility: CLINIC | Age: 63
End: 2023-08-03
Payer: COMMERCIAL

## 2023-08-03 VITALS
SYSTOLIC BLOOD PRESSURE: 132 MMHG | HEIGHT: 65 IN | BODY MASS INDEX: 29.76 KG/M2 | WEIGHT: 178.6 LBS | DIASTOLIC BLOOD PRESSURE: 84 MMHG

## 2023-08-03 DIAGNOSIS — Z86.010 PERSONAL HISTORY OF COLONIC POLYPS: ICD-10-CM

## 2023-08-03 DIAGNOSIS — K21.00 GASTROESOPHAGEAL REFLUX DISEASE WITH ESOPHAGITIS, UNSPECIFIED WHETHER HEMORRHAGE: Primary | ICD-10-CM

## 2023-08-03 DIAGNOSIS — K58.0 IRRITABLE BOWEL SYNDROME WITH DIARRHEA: ICD-10-CM

## 2023-08-03 RX ORDER — TRAMADOL HYDROCHLORIDE 50 MG/1
50 TABLET ORAL EVERY 8 HOURS PRN
COMMUNITY
Start: 2023-07-18

## 2023-08-03 RX ORDER — FAMOTIDINE 40 MG/1
40 TABLET, FILM COATED ORAL DAILY
Qty: 90 TABLET | Refills: 3 | Status: SHIPPED | OUTPATIENT
Start: 2023-08-03

## 2023-08-03 RX ORDER — METHOCARBAMOL 750 MG/1
750 TABLET, FILM COATED ORAL 3 TIMES DAILY
COMMUNITY
Start: 2023-07-18

## 2023-08-03 RX ORDER — PANTOPRAZOLE SODIUM 40 MG/1
40 TABLET, DELAYED RELEASE ORAL 2 TIMES DAILY
Qty: 180 TABLET | Refills: 3 | Status: SHIPPED | OUTPATIENT
Start: 2023-08-03

## 2023-08-03 RX ORDER — GABAPENTIN 100 MG/1
100 CAPSULE ORAL 3 TIMES DAILY
COMMUNITY
Start: 2023-07-18

## 2024-07-15 ENCOUNTER — TRANSCRIBE ORDERS (OUTPATIENT)
Dept: ADMINISTRATIVE | Facility: HOSPITAL | Age: 64
End: 2024-07-15
Payer: COMMERCIAL

## 2024-07-15 DIAGNOSIS — Z12.31 BREAST CANCER SCREENING BY MAMMOGRAM: Primary | ICD-10-CM

## 2024-10-14 ENCOUNTER — HOSPITAL ENCOUNTER (OUTPATIENT)
Dept: MAMMOGRAPHY | Facility: HOSPITAL | Age: 64
Discharge: HOME | End: 2024-10-14
Admitting: FAMILY MEDICINE
Payer: COMMERCIAL

## 2024-10-14 DIAGNOSIS — Z12.31 BREAST CANCER SCREENING BY MAMMOGRAM: ICD-10-CM

## 2024-10-14 PROCEDURE — 77063 BREAST TOMOSYNTHESIS BI: CPT

## 2024-10-14 PROCEDURE — 77067 SCR MAMMO BI INCL CAD: CPT | Performed by: RADIOLOGY

## 2024-10-14 PROCEDURE — 77067 SCR MAMMO BI INCL CAD: CPT

## 2024-10-14 PROCEDURE — 77063 BREAST TOMOSYNTHESIS BI: CPT | Performed by: RADIOLOGY

## (undated) DEVICE — IRR EAR 2OZ

## (undated) DEVICE — HARMONIC ACE +7 LAPAROSCOPIC SHEARS ADVANCED HEMOSTASIS 5MM DIAMETER 36CM SHAFT LENGTH  FOR USE WITH GRAY HAND PIECE ONLY: Brand: HARMONIC ACE

## (undated) DEVICE — SUT PA DBL ARM TC22 T1/2CR 0 36IN

## (undated) DEVICE — VIAL FORMALIN CAP 10P 40ML

## (undated) DEVICE — ANTIBACTERIAL UNDYED BRAIDED (POLYGLACTIN 910), SYNTHETIC ABSORBABLE SUTURE: Brand: COATED VICRYL

## (undated) DEVICE — RETR STAY HK ELAS SHRP 5MM PK/8

## (undated) DEVICE — SPNG GZ WOVN 4X4IN 12PLY 10/BX STRL

## (undated) DEVICE — 3M™ STERI-DRAPE™ INSTRUMENT POUCH 1018L: Brand: STERI-DRAPE™

## (undated) DEVICE — PK LAP GEN 90

## (undated) DEVICE — DRAPE,REIN 53X77,STERILE: Brand: MEDLINE

## (undated) DEVICE — SUCTION CANISTER, 3000CC,SAFELINER: Brand: DEROYAL

## (undated) DEVICE — MANIP UTER ADVINCULA DELINEATOR 3.0CM

## (undated) DEVICE — HYBRID TUBING/CAP SET FOR OLYMPUS® SCOPES: Brand: ERBE

## (undated) DEVICE — APPL HEMOS FOR DELIVERY FLOSEAL

## (undated) DEVICE — SYR LL 3CC

## (undated) DEVICE — JACKT LAB F/R KNIT CUFF/COLR XLG BLU

## (undated) DEVICE — DRSNG WND BORDR/ADHS NONADHR/GZ LF 2X2IN STRL

## (undated) DEVICE — BNDG ADHS PLSTC 1X3IN LF

## (undated) DEVICE — Device

## (undated) DEVICE — ENDOPATH 10MM ENDOSCOPIC BLUNT CHERRY DISSECTORS (12 POUCHES CONTAINING 3 DISSECTORS EACH): Brand: ENDOPATH

## (undated) DEVICE — GLV SURG SENSICARE MICRO PF LF 7.5 STRL

## (undated) DEVICE — DISPOSABLE MONOPOLAR ENDOSCOPIC CORD 10 FT. (3M): Brand: KIRWAN

## (undated) DEVICE — DEV SUT GRSPR CLOSUR 15CM 14G

## (undated) DEVICE — SYR CONTRL LUERLOK 10CC

## (undated) DEVICE — MAT FLR ABSORBENT LG 4FT 10 2.5FT

## (undated) DEVICE — CATHETER,FOLEY,100%SILICONE,16FR,10ML,LF: Brand: MEDLINE

## (undated) DEVICE — UNDYED BRAIDED (POLYGLACTIN 910), SYNTHETIC ABSORBABLE SUTURE: Brand: COATED VICRYL

## (undated) DEVICE — TROCAR: Brand: KII OPTICAL ACCESS SYSTEM

## (undated) DEVICE — SUT VIC 0 CT 36IN J958H

## (undated) DEVICE — CONTAINER,SPECIMEN,OR STERILE,4OZ: Brand: MEDLINE

## (undated) DEVICE — ST IRR CYSTO W/SPK 77IN LF

## (undated) DEVICE — DISSCT ENDO

## (undated) DEVICE — RETR RNG GEN2 31.8X18.3CM

## (undated) DEVICE — PUNCH BIOP 2MM

## (undated) DEVICE — STRUCTURAL BALLOON TROCAR: Brand: AUTO SUTURE

## (undated) DEVICE — DRAPE,UNDERBUTTOCKS,PCH,STERILE: Brand: MEDLINE

## (undated) DEVICE — TRAP POLYP 4CHAMBER

## (undated) DEVICE — 3M™ STERI-STRIP™ REINFORCED ADHESIVE SKIN CLOSURES, R1547, 1/2 IN X 4 IN (12 MM X 100 MM), 6 STRIPS/ENVELOPE: Brand: 3M™ STERI-STRIP™

## (undated) DEVICE — GOWN,NON-REINFORCED,SIRUS,SET IN SLV,XL: Brand: MEDLINE

## (undated) DEVICE — KT ORCA ORCAPOD DISP STRL

## (undated) DEVICE — TROC SYS CANN HERN EZ PRT

## (undated) DEVICE — LAPAROSCOPIC SMOKE ELIMINATION DEVICE: Brand: PNEUVIEW XE

## (undated) DEVICE — ENDOPATH XCEL BLADELESS TROCARS WITH STABILITY SLEEVES: Brand: ENDOPATH XCEL

## (undated) DEVICE — 2, DISPOSABLE SUCTION/IRRIGATOR WITH DISPOSABLE TIP: Brand: STRYKEFLOW

## (undated) DEVICE — SUT PROLN 1 CT 30IN 8435H

## (undated) DEVICE — MASK,FACE,SHIELD,BLUE,ANTI FOG,TIES: Brand: MEDLINE

## (undated) DEVICE — Device: Brand: DEFENDO AIR/WATER/SUCTION AND BIOPSY VALVE

## (undated) DEVICE — SYR LUERLOK 50ML

## (undated) DEVICE — ENDOCUT SCISSOR TIP, DISPOSABLE: Brand: RENEW

## (undated) DEVICE — SNAR POLYP CAPTIFLX WD OVL 27MM 240CM

## (undated) DEVICE — SHEARS WITH ROTICULATOR TECHNOLOGY: Brand: ENDO MINI-SHEARS

## (undated) DEVICE — TBG INSUFL W FLTR STRL

## (undated) DEVICE — GOWN ISOL W/THUMB UNIV BLU BX/15

## (undated) DEVICE — SYR LUER SLPTP 50ML

## (undated) DEVICE — LEGGINGS, PAIR, CLEAR, STERILE: Brand: MEDLINE

## (undated) DEVICE — GLV SURG NEOLON 2G PF LF 7.5 STRL

## (undated) DEVICE — SKIN PREP TRAY W/CHG: Brand: MEDLINE INDUSTRIES, INC.

## (undated) DEVICE — TUBING, SUCTION, 1/4" X 20', STRAIGHT: Brand: MEDLINE INDUSTRIES, INC.

## (undated) DEVICE — VAGINAL PACKING: Brand: DEROYAL

## (undated) DEVICE — 3M™ STERI-DRAPE™ INSTRUMENT POUCH 1018: Brand: STERI-DRAPE™

## (undated) DEVICE — BW-412T DISP COMBO CLEANING BRUSH: Brand: SINGLE USE COMBINATION CLEANING BRUSH

## (undated) DEVICE — SUT VIC 0 TIES 18IN J912G

## (undated) DEVICE — CARDIOVASCULAR SPLIT DRAPE II, OPTIMA: Brand: CONVERTORS

## (undated) DEVICE — SUT VIC 0 CT1 36IN J946H

## (undated) DEVICE — GOWN ,SIRUS,NONREINFORCED SMALL: Brand: MEDLINE

## (undated) DEVICE — BNDG ADHS CURAD FLX/FABRC 2X4IN STRL LF

## (undated) DEVICE — SUT VIC 0 TN 27IN DYED JTN0G

## (undated) DEVICE — FRCP BX RADJAW4 NDL 2.8 240CM LG OG BX40

## (undated) DEVICE — COVER,MAYO STAND,STERILE: Brand: MEDLINE

## (undated) DEVICE — LAPAROSCOPIC GAS CONDITIONING DEVICE.: Brand: INSUFLOW

## (undated) DEVICE — THE BITE BLOCK MAXI, LATEX FREE STRAP IS USED TO PROTECT THE ENDOSCOPE INSERTION TUBE FROM BEING BITTEN BY THE PATIENT.

## (undated) DEVICE — 1010 S-DRAPE TOWEL DRAPE 10/BX: Brand: STERI-DRAPE™

## (undated) DEVICE — GLV SURG BIOGEL LTX PF 6 1/2

## (undated) DEVICE — TOTAL TRAY, 16FR 10ML SIL FOLEY, URN: Brand: MEDLINE

## (undated) DEVICE — SPNG LAP 18X18IN LF STRL PK/5

## (undated) DEVICE — RUBBERBAND LF STRL PK/2

## (undated) DEVICE — ENDOPATH XCEL UNIVERSAL TROCAR STABLILITY SLEEVES: Brand: ENDOPATH XCEL

## (undated) DEVICE — ENCORE® LATEX ORTHO SIZE 6.5, STERILE LATEX POWDER-FREE SURGICAL GLOVE: Brand: ENCORE

## (undated) DEVICE — SOL NACL 0.9PCT 1000ML

## (undated) DEVICE — PK HYST VAG 40

## (undated) DEVICE — VISUALIZATION SYSTEM: Brand: CLEARIFY

## (undated) DEVICE — ADHS SKIN DERMABOND TOP ADVANCED

## (undated) DEVICE — GOWN,SIRUS,NON REINFRCD,LARGE,SET IN SL: Brand: MEDLINE

## (undated) DEVICE — PAD GRND E/S W/CORD SPLT A/

## (undated) DEVICE — ENDOGATOR AUXILIARY WATER JET CONNECTOR: Brand: ENDOGATOR